# Patient Record
Sex: FEMALE | Race: WHITE | Employment: OTHER | URBAN - METROPOLITAN AREA
[De-identification: names, ages, dates, MRNs, and addresses within clinical notes are randomized per-mention and may not be internally consistent; named-entity substitution may affect disease eponyms.]

---

## 2019-11-15 ENCOUNTER — TRANSFERRED RECORDS (OUTPATIENT)
Dept: HEALTH INFORMATION MANAGEMENT | Facility: CLINIC | Age: 84
End: 2019-11-15

## 2019-11-15 ENCOUNTER — HOSPITAL ENCOUNTER (INPATIENT)
Facility: CLINIC | Age: 84
LOS: 1 days | Discharge: LEFT BEFORE DISCHARGE | DRG: 641 | End: 2019-11-18
Attending: EMERGENCY MEDICINE | Admitting: HOSPITALIST
Payer: COMMERCIAL

## 2019-11-15 DIAGNOSIS — R41.0 CONFUSION: ICD-10-CM

## 2019-11-15 DIAGNOSIS — R40.4 TRANSIENT ALTERATION OF AWARENESS: ICD-10-CM

## 2019-11-15 DIAGNOSIS — R45.1 AGITATION: ICD-10-CM

## 2019-11-15 DIAGNOSIS — G93.40 ENCEPHALOPATHY ACUTE: Primary | ICD-10-CM

## 2019-11-15 PROCEDURE — 99285 EMERGENCY DEPT VISIT HI MDM: CPT | Mod: 25

## 2019-11-15 PROCEDURE — 93005 ELECTROCARDIOGRAM TRACING: CPT

## 2019-11-16 ENCOUNTER — APPOINTMENT (OUTPATIENT)
Dept: OCCUPATIONAL THERAPY | Facility: CLINIC | Age: 84
DRG: 641 | End: 2019-11-16
Attending: HOSPITALIST
Payer: COMMERCIAL

## 2019-11-16 ENCOUNTER — APPOINTMENT (OUTPATIENT)
Dept: CT IMAGING | Facility: CLINIC | Age: 84
DRG: 641 | End: 2019-11-16
Attending: EMERGENCY MEDICINE
Payer: COMMERCIAL

## 2019-11-16 PROBLEM — G93.40 ENCEPHALOPATHY ACUTE: Status: ACTIVE | Noted: 2019-11-16

## 2019-11-16 LAB
ALBUMIN SERPL-MCNC: 3.6 G/DL (ref 3.4–5)
ALBUMIN UR-MCNC: NEGATIVE MG/DL
ALP SERPL-CCNC: 84 U/L (ref 40–150)
ALT SERPL W P-5'-P-CCNC: 17 U/L (ref 0–50)
AMMONIA PLAS-SCNC: 23 UMOL/L (ref 10–50)
ANION GAP SERPL CALCULATED.3IONS-SCNC: 5 MMOL/L (ref 3–14)
ANION GAP SERPL CALCULATED.3IONS-SCNC: 6 MMOL/L (ref 3–14)
APPEARANCE UR: CLEAR
APTT PPP: 33 SEC (ref 22–37)
AST SERPL W P-5'-P-CCNC: 18 U/L (ref 0–45)
BASOPHILS # BLD AUTO: 0 10E9/L (ref 0–0.2)
BASOPHILS # BLD AUTO: 0 10E9/L (ref 0–0.2)
BASOPHILS NFR BLD AUTO: 0.3 %
BASOPHILS NFR BLD AUTO: 0.7 %
BILIRUB DIRECT SERPL-MCNC: 0.1 MG/DL (ref 0–0.2)
BILIRUB SERPL-MCNC: 0.5 MG/DL (ref 0.2–1.3)
BILIRUB UR QL STRIP: NEGATIVE
BUN SERPL-MCNC: 10 MG/DL (ref 7–30)
BUN SERPL-MCNC: 15 MG/DL (ref 7–30)
CALCIUM SERPL-MCNC: 8.7 MG/DL (ref 8.5–10.1)
CALCIUM SERPL-MCNC: 9 MG/DL (ref 8.5–10.1)
CHLORIDE SERPL-SCNC: 101 MMOL/L (ref 94–109)
CHLORIDE SERPL-SCNC: 104 MMOL/L (ref 94–109)
CO2 SERPL-SCNC: 28 MMOL/L (ref 20–32)
CO2 SERPL-SCNC: 30 MMOL/L (ref 20–32)
COLOR UR AUTO: ABNORMAL
CREAT SERPL-MCNC: 0.44 MG/DL (ref 0.52–1.04)
CREAT SERPL-MCNC: 0.46 MG/DL (ref 0.52–1.04)
DIFFERENTIAL METHOD BLD: NORMAL
DIFFERENTIAL METHOD BLD: NORMAL
EOSINOPHIL # BLD AUTO: 0.1 10E9/L (ref 0–0.7)
EOSINOPHIL # BLD AUTO: 0.1 10E9/L (ref 0–0.7)
EOSINOPHIL NFR BLD AUTO: 1.4 %
EOSINOPHIL NFR BLD AUTO: 1.9 %
ERYTHROCYTE [DISTWIDTH] IN BLOOD BY AUTOMATED COUNT: 13.4 % (ref 10–15)
ERYTHROCYTE [DISTWIDTH] IN BLOOD BY AUTOMATED COUNT: 13.4 % (ref 10–15)
ETHANOL SERPL-MCNC: <0.01 G/DL
GFR SERPL CREATININE-BSD FRML MDRD: 88 ML/MIN/{1.73_M2}
GFR SERPL CREATININE-BSD FRML MDRD: 89 ML/MIN/{1.73_M2}
GLUCOSE SERPL-MCNC: 106 MG/DL (ref 70–99)
GLUCOSE SERPL-MCNC: 97 MG/DL (ref 70–99)
GLUCOSE UR STRIP-MCNC: NEGATIVE MG/DL
HCT VFR BLD AUTO: 39.3 % (ref 35–47)
HCT VFR BLD AUTO: 41.7 % (ref 35–47)
HGB BLD-MCNC: 12.4 G/DL (ref 11.7–15.7)
HGB BLD-MCNC: 13.6 G/DL (ref 11.7–15.7)
HGB UR QL STRIP: NEGATIVE
IMM GRANULOCYTES # BLD: 0 10E9/L (ref 0–0.4)
IMM GRANULOCYTES # BLD: 0 10E9/L (ref 0–0.4)
IMM GRANULOCYTES NFR BLD: 0.2 %
IMM GRANULOCYTES NFR BLD: 0.2 %
INR PPP: 0.96 (ref 0.86–1.14)
INTERPRETATION ECG - MUSE: NORMAL
KETONES UR STRIP-MCNC: NEGATIVE MG/DL
LEUKOCYTE ESTERASE UR QL STRIP: NEGATIVE
LYMPHOCYTES # BLD AUTO: 1.8 10E9/L (ref 0.8–5.3)
LYMPHOCYTES # BLD AUTO: 1.9 10E9/L (ref 0.8–5.3)
LYMPHOCYTES NFR BLD AUTO: 30.2 %
LYMPHOCYTES NFR BLD AUTO: 31.9 %
MCH RBC QN AUTO: 28.2 PG (ref 26.5–33)
MCH RBC QN AUTO: 29.2 PG (ref 26.5–33)
MCHC RBC AUTO-ENTMCNC: 31.6 G/DL (ref 31.5–36.5)
MCHC RBC AUTO-ENTMCNC: 32.6 G/DL (ref 31.5–36.5)
MCV RBC AUTO: 89 FL (ref 78–100)
MCV RBC AUTO: 90 FL (ref 78–100)
MONOCYTES # BLD AUTO: 0.5 10E9/L (ref 0–1.3)
MONOCYTES # BLD AUTO: 0.7 10E9/L (ref 0–1.3)
MONOCYTES NFR BLD AUTO: 11.4 %
MONOCYTES NFR BLD AUTO: 8.4 %
NEUTROPHILS # BLD AUTO: 3.3 10E9/L (ref 1.6–8.3)
NEUTROPHILS # BLD AUTO: 3.4 10E9/L (ref 1.6–8.3)
NEUTROPHILS NFR BLD AUTO: 56.5 %
NEUTROPHILS NFR BLD AUTO: 56.9 %
NITRATE UR QL: NEGATIVE
NRBC # BLD AUTO: 0 10*3/UL
NRBC # BLD AUTO: 0 10*3/UL
NRBC BLD AUTO-RTO: 0 /100
NRBC BLD AUTO-RTO: 0 /100
PH UR STRIP: 7.5 PH (ref 5–7)
PLATELET # BLD AUTO: 181 10E9/L (ref 150–450)
PLATELET # BLD AUTO: 192 10E9/L (ref 150–450)
POTASSIUM SERPL-SCNC: 3.5 MMOL/L (ref 3.4–5.3)
POTASSIUM SERPL-SCNC: 4.2 MMOL/L (ref 3.4–5.3)
PROCALCITONIN SERPL-MCNC: 0.06 NG/ML
PROT SERPL-MCNC: 7.2 G/DL (ref 6.8–8.8)
RBC # BLD AUTO: 4.4 10E12/L (ref 3.8–5.2)
RBC # BLD AUTO: 4.66 10E12/L (ref 3.8–5.2)
RBC #/AREA URNS AUTO: 4 /HPF (ref 0–2)
SODIUM SERPL-SCNC: 136 MMOL/L (ref 133–144)
SODIUM SERPL-SCNC: 138 MMOL/L (ref 133–144)
SOURCE: ABNORMAL
SP GR UR STRIP: >1.035 (ref 1–1.03)
TROPONIN I SERPL-MCNC: 0.04 UG/L (ref 0–0.04)
TROPONIN I SERPL-MCNC: 0.07 UG/L (ref 0–0.04)
UROBILINOGEN UR STRIP-MCNC: NORMAL MG/DL (ref 0–2)
VIT B12 SERPL-MCNC: 1136 PG/ML (ref 193–986)
WBC # BLD AUTO: 5.8 10E9/L (ref 4–11)
WBC # BLD AUTO: 5.9 10E9/L (ref 4–11)
WBC #/AREA URNS AUTO: <1 /HPF (ref 0–5)

## 2019-11-16 PROCEDURE — 85730 THROMBOPLASTIN TIME PARTIAL: CPT | Performed by: EMERGENCY MEDICINE

## 2019-11-16 PROCEDURE — 85025 COMPLETE CBC W/AUTO DIFF WBC: CPT | Performed by: EMERGENCY MEDICINE

## 2019-11-16 PROCEDURE — 97165 OT EVAL LOW COMPLEX 30 MIN: CPT | Mod: GO | Performed by: OCCUPATIONAL THERAPIST

## 2019-11-16 PROCEDURE — 25000132 ZZH RX MED GY IP 250 OP 250 PS 637: Performed by: HOSPITALIST

## 2019-11-16 PROCEDURE — 80320 DRUG SCREEN QUANTALCOHOLS: CPT | Performed by: EMERGENCY MEDICINE

## 2019-11-16 PROCEDURE — 85025 COMPLETE CBC W/AUTO DIFF WBC: CPT | Performed by: HOSPITALIST

## 2019-11-16 PROCEDURE — 84484 ASSAY OF TROPONIN QUANT: CPT | Performed by: HOSPITALIST

## 2019-11-16 PROCEDURE — 84484 ASSAY OF TROPONIN QUANT: CPT | Performed by: EMERGENCY MEDICINE

## 2019-11-16 PROCEDURE — 82140 ASSAY OF AMMONIA: CPT | Performed by: HOSPITALIST

## 2019-11-16 PROCEDURE — G0378 HOSPITAL OBSERVATION PER HR: HCPCS

## 2019-11-16 PROCEDURE — 82607 VITAMIN B-12: CPT | Performed by: HOSPITALIST

## 2019-11-16 PROCEDURE — 25000132 ZZH RX MED GY IP 250 OP 250 PS 637: Performed by: PSYCHIATRY & NEUROLOGY

## 2019-11-16 PROCEDURE — 85610 PROTHROMBIN TIME: CPT | Performed by: EMERGENCY MEDICINE

## 2019-11-16 PROCEDURE — 70450 CT HEAD/BRAIN W/O DYE: CPT

## 2019-11-16 PROCEDURE — 81001 URINALYSIS AUTO W/SCOPE: CPT | Performed by: EMERGENCY MEDICINE

## 2019-11-16 PROCEDURE — 99220 ZZC INITIAL OBSERVATION CARE,LEVL III: CPT | Performed by: HOSPITALIST

## 2019-11-16 PROCEDURE — 80048 BASIC METABOLIC PNL TOTAL CA: CPT | Performed by: EMERGENCY MEDICINE

## 2019-11-16 PROCEDURE — 25000132 ZZH RX MED GY IP 250 OP 250 PS 637: Performed by: EMERGENCY MEDICINE

## 2019-11-16 PROCEDURE — 0042T CT HEAD PERFUSION WITH CONTRAST: CPT

## 2019-11-16 PROCEDURE — 80076 HEPATIC FUNCTION PANEL: CPT | Performed by: EMERGENCY MEDICINE

## 2019-11-16 PROCEDURE — 84145 PROCALCITONIN (PCT): CPT | Performed by: HOSPITALIST

## 2019-11-16 PROCEDURE — 70498 CT ANGIOGRAPHY NECK: CPT

## 2019-11-16 PROCEDURE — 36415 COLL VENOUS BLD VENIPUNCTURE: CPT | Performed by: HOSPITALIST

## 2019-11-16 PROCEDURE — 25000128 H RX IP 250 OP 636: Performed by: EMERGENCY MEDICINE

## 2019-11-16 PROCEDURE — 80048 BASIC METABOLIC PNL TOTAL CA: CPT | Performed by: HOSPITALIST

## 2019-11-16 PROCEDURE — 99207 ZZC APP CREDIT; MD BILLING SHARED VISIT: CPT | Performed by: HOSPITALIST

## 2019-11-16 PROCEDURE — 25800030 ZZH RX IP 258 OP 636: Performed by: HOSPITALIST

## 2019-11-16 RX ORDER — SODIUM CHLORIDE, SODIUM LACTATE, POTASSIUM CHLORIDE, CALCIUM CHLORIDE 600; 310; 30; 20 MG/100ML; MG/100ML; MG/100ML; MG/100ML
INJECTION, SOLUTION INTRAVENOUS CONTINUOUS
Status: DISCONTINUED | OUTPATIENT
Start: 2019-11-16 | End: 2019-11-17

## 2019-11-16 RX ORDER — ONDANSETRON 4 MG/1
4 TABLET, ORALLY DISINTEGRATING ORAL EVERY 6 HOURS PRN
Status: DISCONTINUED | OUTPATIENT
Start: 2019-11-16 | End: 2019-11-18 | Stop reason: HOSPADM

## 2019-11-16 RX ORDER — NALOXONE HYDROCHLORIDE 0.4 MG/ML
.1-.4 INJECTION, SOLUTION INTRAMUSCULAR; INTRAVENOUS; SUBCUTANEOUS
Status: DISCONTINUED | OUTPATIENT
Start: 2019-11-16 | End: 2019-11-18 | Stop reason: HOSPADM

## 2019-11-16 RX ORDER — AMOXICILLIN 250 MG
1 CAPSULE ORAL 2 TIMES DAILY PRN
Status: DISCONTINUED | OUTPATIENT
Start: 2019-11-16 | End: 2019-11-18 | Stop reason: HOSPADM

## 2019-11-16 RX ORDER — LANOLIN ALCOHOL/MO/W.PET/CERES
3 CREAM (GRAM) TOPICAL
Status: DISCONTINUED | OUTPATIENT
Start: 2019-11-16 | End: 2019-11-18 | Stop reason: HOSPADM

## 2019-11-16 RX ORDER — ACETAMINOPHEN 325 MG/1
650 TABLET ORAL EVERY 4 HOURS PRN
Status: DISCONTINUED | OUTPATIENT
Start: 2019-11-16 | End: 2019-11-18 | Stop reason: HOSPADM

## 2019-11-16 RX ORDER — AMOXICILLIN 250 MG
2 CAPSULE ORAL 2 TIMES DAILY PRN
Status: DISCONTINUED | OUTPATIENT
Start: 2019-11-16 | End: 2019-11-18 | Stop reason: HOSPADM

## 2019-11-16 RX ORDER — BISACODYL 10 MG
10 SUPPOSITORY, RECTAL RECTAL DAILY PRN
Status: DISCONTINUED | OUTPATIENT
Start: 2019-11-16 | End: 2019-11-18 | Stop reason: HOSPADM

## 2019-11-16 RX ORDER — ACETAMINOPHEN 650 MG/1
650 SUPPOSITORY RECTAL EVERY 4 HOURS PRN
Status: DISCONTINUED | OUTPATIENT
Start: 2019-11-16 | End: 2019-11-18 | Stop reason: HOSPADM

## 2019-11-16 RX ORDER — MULTIPLE VITAMINS W/ MINERALS TAB 9MG-400MCG
1 TAB ORAL DAILY
COMMUNITY

## 2019-11-16 RX ORDER — LANOLIN ALCOHOL/MO/W.PET/CERES
100 CREAM (GRAM) TOPICAL DAILY
Status: DISCONTINUED | OUTPATIENT
Start: 2019-11-16 | End: 2019-11-17

## 2019-11-16 RX ORDER — OLANZAPINE 5 MG/1
5 TABLET, ORALLY DISINTEGRATING ORAL 2 TIMES DAILY PRN
Status: DISCONTINUED | OUTPATIENT
Start: 2019-11-16 | End: 2019-11-18 | Stop reason: HOSPADM

## 2019-11-16 RX ORDER — GUAIFENESIN 600 MG/1
600 TABLET, EXTENDED RELEASE ORAL 2 TIMES DAILY
Status: DISCONTINUED | OUTPATIENT
Start: 2019-11-16 | End: 2019-11-18 | Stop reason: HOSPADM

## 2019-11-16 RX ORDER — OLANZAPINE 5 MG/1
5 TABLET, ORALLY DISINTEGRATING ORAL ONCE
Status: COMPLETED | OUTPATIENT
Start: 2019-11-16 | End: 2019-11-16

## 2019-11-16 RX ORDER — POLYETHYLENE GLYCOL 3350 17 G/17G
17 POWDER, FOR SOLUTION ORAL DAILY PRN
Status: DISCONTINUED | OUTPATIENT
Start: 2019-11-16 | End: 2019-11-18 | Stop reason: HOSPADM

## 2019-11-16 RX ORDER — CHLORAL HYDRATE 500 MG
2 CAPSULE ORAL DAILY
COMMUNITY

## 2019-11-16 RX ORDER — LORAZEPAM 0.5 MG/1
0.5 TABLET ORAL EVERY 6 HOURS PRN
Status: ON HOLD | COMMUNITY
End: 2019-11-18

## 2019-11-16 RX ORDER — CHOLECALCIFEROL (VITAMIN D3) 50 MCG
1 TABLET ORAL DAILY
COMMUNITY

## 2019-11-16 RX ORDER — IOPAMIDOL 755 MG/ML
120 INJECTION, SOLUTION INTRAVASCULAR ONCE
Status: COMPLETED | OUTPATIENT
Start: 2019-11-16 | End: 2019-11-16

## 2019-11-16 RX ORDER — LIDOCAINE 40 MG/G
CREAM TOPICAL
Status: DISCONTINUED | OUTPATIENT
Start: 2019-11-16 | End: 2019-11-18 | Stop reason: HOSPADM

## 2019-11-16 RX ORDER — ONDANSETRON 2 MG/ML
4 INJECTION INTRAMUSCULAR; INTRAVENOUS EVERY 6 HOURS PRN
Status: DISCONTINUED | OUTPATIENT
Start: 2019-11-16 | End: 2019-11-18 | Stop reason: HOSPADM

## 2019-11-16 RX ADMIN — SODIUM CHLORIDE, POTASSIUM CHLORIDE, SODIUM LACTATE AND CALCIUM CHLORIDE: 600; 310; 30; 20 INJECTION, SOLUTION INTRAVENOUS at 05:43

## 2019-11-16 RX ADMIN — Medication 100 MG: at 16:39

## 2019-11-16 RX ADMIN — GUAIFENESIN 600 MG: 600 TABLET, EXTENDED RELEASE ORAL at 11:05

## 2019-11-16 RX ADMIN — GUAIFENESIN 600 MG: 600 TABLET, EXTENDED RELEASE ORAL at 20:37

## 2019-11-16 RX ADMIN — IOPAMIDOL 120 ML: 755 INJECTION, SOLUTION INTRAVENOUS at 01:09

## 2019-11-16 RX ADMIN — Medication 1 LOZENGE: at 18:14

## 2019-11-16 RX ADMIN — OLANZAPINE 5 MG: 5 TABLET, ORALLY DISINTEGRATING ORAL at 02:49

## 2019-11-16 ASSESSMENT — ACTIVITIES OF DAILY LIVING (ADL): PREVIOUS_RESPONSIBILITIES: MEAL PREP;HOUSEKEEPING;LAUNDRY;SHOPPING;MEDICATION MANAGEMENT;FINANCES;DRIVING

## 2019-11-16 NOTE — PLAN OF CARE
Admitted from ED around 0530. Here with AMS and confusion. AOx3, disoriented to situation. Neuros intact. Pt agitated and frustrated at times. Lung sounds diminished. Frequent congested cough. Regular diet. Up assist 1 GBW. Pt would like cane if available. IVF infusing. Refused tele. Plan pending progress.

## 2019-11-16 NOTE — ED NOTES
"While walking the the pt, she stated that she doesn't have her nogueira and believes that she left it in her car. Upon asking where her car was she stated \"Its at the place they brought me earlier tonight\". With additional questioning she stated \"The ambulance brought me to an [facility] before they brought me here tonight\". She did not mention being at a country club or talking with Police. She is cooperating with requests however is impatient and insistent on getting her IV removed and getting home. She also stated that she is so tired and doesn't want to lay down because she doesn't want to fall asleep.  "

## 2019-11-16 NOTE — ED NOTES
"[facility] [place]  ED Nurse Handoff Report    ED Chief complaint: Altered Mental Status      ED Diagnosis:   Final diagnoses:   Confusion   Transient alteration of awareness   Agitation       Code Status: See MD note    Allergies:   Allergies   Allergen Reactions     Penicillins Anaphylaxis       Activity level - Baseline/Home:  independent  Activity Level - Current:   Independent    Patient's Preferred language: English   Needed?: No    Isolation: No  Infection: Not Applicable  Bariatric?: No    Vital Signs:   Vitals:    11/15/19 2339   BP: 133/88   Temp: 98.3  F (36.8  C)   TempSrc: Oral   SpO2: 94%       Cardiac Rhythm: ,        Pain level:      Is this patient confused?: Yes   Does this patient have a guardian?  No         If yes, is there guardianship documents in the Epic \"Code/ACP\" activity?  N/A         Guardian Notified?  N/A  [place] - Suicide Severity Rating Scale Completed?  Yes  If yes, what color did the patient score?  White    Patient Report: Initial Complaint: EMS picked patient up after patient acting confused and could not recall all of the events today.  States she was on her way to a hair appointment at noon and somehow got lost while driving.  She states a  found her and asked her too many questions so she felt really upset and then somehow ended up at the hospital.  This story has changed each time it is asked. Later in her visit to the ER she became more confused and agitated so MD called a code stroke.  CT okay and code was deescalated.  Redirection is needed.  Able to follow direction and has been staying in bed.  Refused to change out of clothes, but did take earrings off.  72 hour hold.  Took zyprexa with encouragement.  Focused Assessment: A/O to self and place only  Tests Performed:   Results for orders placed or performed during the hospital encounter of 11/15/19   CBC with platelets differential     Status: None   Result Value Ref Range    WBC 5.9 " 4.0 - 11.0 10e9/L    RBC Count 4.66 3.8 - 5.2 10e12/L    Hemoglobin 13.6 11.7 - 15.7 g/dL    Hematocrit 41.7 35.0 - 47.0 %    MCV 90 78 - 100 fl    MCH 29.2 26.5 - 33.0 pg    MCHC 32.6 31.5 - 36.5 g/dL    RDW 13.4 10.0 - 15.0 %    Platelet Count 192 150 - 450 10e9/L    Diff Method Automated Method     % Neutrophils 56.9 %    % Lymphocytes 31.9 %    % Monocytes 8.4 %    % Eosinophils 1.9 %    % Basophils 0.7 %    % Immature Granulocytes 0.2 %    Nucleated RBCs 0 0 /100    Absolute Neutrophil 3.4 1.6 - 8.3 10e9/L    Absolute Lymphocytes 1.9 0.8 - 5.3 10e9/L    Absolute Monocytes 0.5 0.0 - 1.3 10e9/L    Absolute Eosinophils 0.1 0.0 - 0.7 10e9/L    Absolute Basophils 0.0 0.0 - 0.2 10e9/L    Abs Immature Granulocytes 0.0 0 - 0.4 10e9/L    Absolute Nucleated RBC 0.0    Basic metabolic panel     Status: Abnormal   Result Value Ref Range    Sodium 136 133 - 144 mmol/L    Potassium 4.2 3.4 - 5.3 mmol/L    Chloride 101 94 - 109 mmol/L    Carbon Dioxide 30 20 - 32 mmol/L    Anion Gap 5 3 - 14 mmol/L    Glucose 106 (H) 70 - 99 mg/dL    Urea Nitrogen 15 7 - 30 mg/dL    Creatinine 0.46 (L) 0.52 - 1.04 mg/dL    GFR Estimate 88 >60 mL/min/[1.73_m2]    GFR Estimate If Black >90 >60 mL/min/[1.73_m2]    Calcium 9.0 8.5 - 10.1 mg/dL   Troponin I     Status: Abnormal   Result Value Ref Range    Troponin I ES 0.069 (H) 0.000 - 0.045 ug/L   Alcohol ethyl     Status: None   Result Value Ref Range    Ethanol g/dL <0.01 <0.01 g/dL   Hepatic panel     Status: None   Result Value Ref Range    Bilirubin Direct 0.1 0.0 - 0.2 mg/dL    Bilirubin Total 0.5 0.2 - 1.3 mg/dL    Albumin 3.6 3.4 - 5.0 g/dL    Protein Total 7.2 6.8 - 8.8 g/dL    Alkaline Phosphatase 84 40 - 150 U/L    ALT 17 0 - 50 U/L    AST 18 0 - 45 U/L   INR     Status: None   Result Value Ref Range    INR 0.96 0.86 - 1.14   Partial thromboplastin time     Status: None   Result Value Ref Range    PTT 33 22 - 37 sec   EKG 12 lead     Status: None   Result Value Ref Range     Interpretation ECG Click View Image link to view waveform and result        Abnormal Results: see above  Treatments provided: see above    Family Comments: none    OBS brochure/video discussed/provided to patient/family: Yes              Name of person given brochure if not patient: na              Relationship to patient: na    ED Medications:   Medications   100mL Saline flush (has no administration in time range)   iopamidol (ISOVUE-370) solution 120 mL (120 mLs Intravenous Given 11/16/19 0109)       Drips infusing?:  No    For the majority of the shift this patient was Yellow  Interventions performed were encouragement and redirection    Severe Sepsis OR Septic Shock Diagnosis Present: No    To be done/followed up on inpatient unit:      ED NURSE PHONE NUMBER: [phone]

## 2019-11-16 NOTE — ED NOTES
"Pt sitting in a chair, refuses to lay down on a stretcher. States that she is not happy about being here, \"I want to go home now\" and stands up from the chair, grabs her jacket and attempts to sridhar towards the door. When I reminded her that is midnight, its cold outside and she has no car, she returns to the chair and states \"well, I can take a cab home\" pt will be moved to room 16 as she is a flight risk and is on a hold.  "

## 2019-11-16 NOTE — PLAN OF CARE
"Discharge Planner OT   Patient plan for discharge: Home  Current status: Orders received, eval completed, Attempted to engage pt in SLUMS and MoCA exam, pt would initially participate and when questions became difficult for her she would state \"I'm not even going to try, this is stupid.\" Ed and additional time spent on engaging pt, she completed SLUMS with 17/30 indicating severe cognitive impairment; but with decreased effort and attention which may have impacted score. Generally noted that pt appeared to have decreased problem solving, had difficulty and confusion with MoCA Visuospatial portion of test and therapist changed tests due to level of frustration from pt with MoCA items. Pt drives and demonstrated difficulty with the trail portion. Pt showed decreased insight and safety awareness generally. Pt completed transfer and ambulation with SBA, somewhat unsteady with use of SEC refused 2WW. Was not receptive to other neuro or visual exams by therapist.   Barriers to return to prior living situation: cognition, safety, lives alone  Recommendations for discharge: TCU  Rationale for recommendations: At this time recommendation for TCU based on reduced safety and problem solving, based on assessment pt not safe to discharge home alone, and generally pt not cooperative with OT recommendations or activities. Pt would require additional testing prior to resuming role in medication management or driving as she is demonstrating reduced cognition, processing, and problem solving. Pt reports she does not wish to participate in further OT, is on OBS status, discharge recommendations in place, will complete orders.        Entered by: Salena López 11/16/2019 4:05 PM       "

## 2019-11-16 NOTE — ED TRIAGE NOTES
Arrived via EMS on a  hold. Patient was driving to a hair salon in [place] from her apartment in [place], police found her wondering around a country club.  At this moment pt is A&Ox4, uncooperative but answers questions, refuses to change into a gown.

## 2019-11-16 NOTE — PROGRESS NOTES
RECEIVING UNIT ED HANDOFF REVIEW    ED Nurse Handoff Report was reviewed by: Atiya Jean Baptiste RN on November 16, 2019 at 4:55 AM

## 2019-11-16 NOTE — ED PROVIDER NOTES
"  History     Chief Complaint:  Altered Mental Status    The history is provided by the patient.      Adali Fatima is a 89 year old female who presents to the emergency department today via EMS on a  Hold for evaluation of an altered mental status. 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 Police state she was found wandering around at a country club tonight and appeared to be confused, prompting them to bring her here. She admits to drinking yesterday afternoon, but she is unclear as to the timeline and the amount she had, stating it was \"a long time ago, I can't give you the exact time\". The patient states she did not have much to eat yesterday, as she was not hungry but is hungry now. She lives alone and states she has a nephew who lives in [place]. She denies having any chest pain, abdominal pain, headache, vomiting, diarrhea, as well as any numbness or tingling in her extremities. The patient adds she was seen in a clinic yesterday for evaluation of a cough she has had, it was suspected to be a URI and she did not seem significantly confused per the physician's note. She denies using any daily or street drugs.    Allergies:  Tetanus toxoid, fluid  Zoledronic acid  Calcium carbonate  Penicillins    Medications:    Lorazepam    Past Medical History:    Osteoarthritis  Cancer, breast  Osteoporosis  GERD  Depression   Anxiety     Past Surgical History:    Mastectomy  Total abdominal hysterectomy and bilateral salipingo oopherectomy   Tonsillectomy and adenoidectomy  [place] teeth extraction    Family History:    Cancer- prostate  DVT- Brother & Sister  Stroke  Blood disease  Heart disease- Mother  Hypertension  Hyperlipidemia    Social History:  The patient was unaccompanied to the ED.  Smoking Status: Positive, ocasional  Alcohol " Use: Positive   Drug Use: Negative   The patient lives on her own in an apartment in [place].    Review of Systems   All other systems reviewed and are negative.      Physical Exam     Patient Vitals for the past 24 hrs:   BP Temp Temp src Heart Rate SpO2   11/15/19 2339 133/88 98.3  F (36.8  C) Oral 82 94 %      Physical Exam  General: Resting on the bed.  Head: No obvious trauma to head.  Ears, Nose, Throat:  External ears normal.  Nose normal.  No pharyngeal erythema, swelling or exudate.  Midline uvula.  clear TMs  Eyes:  Conjunctivae clear.  Pupils are equal, round, and reactive.   Neck: Normal range of motion.  Neck supple.   CV: Regular rate and rhythm.  No murmurs.      Respiratory: Effort normal and breath sounds normal.  No wheezing or crackles.   Gastrointestinal: Soft.  No distension. There is no tenderness.    Neuro: Alert. Moving all extremities appropriately.  Normal speech.  CN II-XII grossly intact, no pronator drift, normal finger-nose-finger, visual fields intact.  Gross muscle strength intact of the proximal and distal bilateral upper and lower extremities.  Sensation intact to light touch in all 4 extremities.   Normal gait.    Skin: Skin is warm and dry.  No rash noted.   Psych: agitated, labile, aggressive     Emergency Department Course     ECG:  ECG taken at 0012, ECG read at 0020  Sinus rhythm with frequent premature ventricular complexes  Possible left atrial enlargement  Septal infarct, age undetermined  Abnormal ECG   Rate 90 bpm. IA interval 180. QRS duration 88. QT/QTc 402/491. P-R-T axes 65 47 11.  No old available to compare with.    Imaging:  Radiology findings were communicated with the patient who voiced understanding of the findings.  CT, Head without Contrast  HEAD CT:  1.  No CT evidence for acute intracranial process.  2.  Brain atrophy and presumed chronic microvascular ischemic changes as above.  Reading per radiology    CTA, Head & Neck with Contrast  HEAD CTA:   1.   Normal CTA [place].  NECK CTA:  1.  No measurable stenosis or dissection.  2.  Bilateral thyroid nodules as detailed above. Consider thyroid ultrasound if not done previously.  Reading per radiology    CT, Head Perfusion with Contrast  1.  Normal cerebral perfusion.  Reading per radiology    Laboratory:  Laboratory findings were communicated with the patient who voiced understanding of the findings.  CBC: AWNL (WBC 5.9, HGB 13.6, )   BMP: Glucose 106 (H). O/w WNL (Creatinine 0.46)  Troponin (Collected 0006): 0.069 (H)  INR: 0.96   PTT: 33   Alcohol ethyl: <0.01   Hepatic Panel: Bilirubin direct 0.1, bilirubin total 0.5, albumin 3.6, protein total 7.2, alkaline phosphatase 84, ALT 17, AST 18.    Interventions:  0249 Zyprexa 5 mg PO    Emergency Department Course:  2346 Nursing notes and vitals reviewed.  2350 I performed an exam of the patient as documented above.   0011 IV was inserted and blood was drawn for laboratory testing, results above.  0012 An ECG was performed, results above.   0045 I reevaluated the patient after tech staff noted her to be more confused upon providing a urine sample. On my exam she was more confused and her story became more inconsistent. She also seems to be having difficulty recalling things just explained to her. Her speech is normal, but she is struggling to retain information.   0100 Code stroke called.  0109 I spoke with Dr. Upton of the stroke-neurology service from [facility] regarding patient's presentation, findings, and plan of care.   0149 The patient was sent for CT scans while in the emergency department, results above.    0201 Code stroke deescalated.   0222 I spoke with Dr. Wiley of the hospitalist service from [facility] regarding patient's presentation, findings, and plan of care.     Findings and plan explained to the Patient who consents to admission. Discussed the patient with Dr. Wiley, who will admit the patient to a observed bed for further  monitoring, evaluation, and treatment.     I personally reviewed the laboratory and imaging results with the Patient and answered all related questions prior to admission.     Impression & Plan      Medical Decision Makin-year-old female otherwise largely healthy presents with altered mental status.  Vital signs are reassuring.  Broad differential was pursued including not limited to acute electrolyte, metabolic, renal dysfunction, alcohol intoxication, trauma, stroke, transient global amnesia, TIA, acute coronary syndrome, arrhythmia, etc.  Patient initially was making sense.  She was speaking in full sentences.  She seemed to be alert and oriented x4.  We are able to convince her to do blood work.  CBC shows no leukocytosis or anemia.  BMP shows no acute electrolyte, metabolic or renal dysfunction.  EKG showed sinus rhythm with PVCs but no acute ischemic changes.  Troponin was elevated of unclear etiology.  Patient denies any chest pain or shortness of breath.  I was concerned that this may be secondary to an intracranial pathology.  Alcohol level is negative.  I then reassessed the patient and she seemed to be more confused, having repetitive questioning, not oriented to place.  Code stroke was called.  CT head shows no acute intracranial hemorrhage, mass, infarct.  CT angios was normal.  Perfusion scan was normal.  No large vessel occlusion to indicate emergent procedure.  Patient is not a TPA candidate.  Symptoms seem less consistent with a stroke.  Patient's presentation may be more similar to a transient global amnesia.  Discussed with stroke neurology recommended observation to see if patient clears her mental status, this would be consistent with transient global amnesia.  If patient does not improve then MRI would be indicated.  Patient was admitted to the hospitalist.  Patient was irritable and agitated.  She did agree to take 5 of oral Zyprexa.  She was also placed on a 72-hour hold given that she  was not able to understand the situation, not able to care for self, possible danger to self and others.  Patient was admitted to the floor.      Critical Care time:  none    Diagnosis:    ICD-10-CM    1. Confusion R41.0    2. Transient alteration of awareness R40.4    3. Agitation R45.1        Disposition:  The patient is admitted into the care of Dr. Wiley.     Scribe Disclosure:  I, Kiki Jones, am serving as a scribe at 11:49 PM on 11/15/2019 to document services personally performed by Astrid Durham MD based on my observations and the provider's statements to me.    11/15/2019    EMERGENCY DEPARTMENT       Astrid Durham MD  11/16/19 0622

## 2019-11-16 NOTE — ED NOTES
Bed: ED05  Expected date:   Expected time:   Means of arrival:   Comments:  534  89F AMS/HR irregular  9956

## 2019-11-16 NOTE — CONSULTS
"[facility]    Neurology Consultation     Date of Admission:  11/15/2019      Assessment & Plan   Adali Fatima is a 89 year old female who was admitted on 11/15/2019. I was asked to see the patient for confusion.  She is doing well this morning.  Able to tell me where she is, why she is in the hospital.  While there may be some subtle cognitive changes currently and a potential history of significant alcohol use, I do not see anything that suggests a significant encephalopathy or further need for work up at this time otherwise.  Suspicion high for  Wernicke's encephalopathy, consider neuropsych testing as an outpatient for further evaluation for underlying cognitive impairment.      Recommend thiamine supplementation    I discussed the above diagnosis, assessment, and further testing with the patient.  Total time: 50 Minutes, with more than 50% of the time counseling the patient or coordination of care.       Ian Deleon MD    Code Status    Full Code        Reason for Consult   Reason for consult: I was asked by Dr. Wiley to evaluate this patient for confusion.      Chief Complaint   Confusion    History is obtained from the patient and the electronic medical chart.    History of Present Illness   Adali Fatima is a 89 year old woman who brought in after police found [place]. She is not able to provide further details but notes that she woke up in her usual health this morning, and went to go to the hair dressers later in the morning, and pulled her care over to ask a  how to get there on the way.   She can not recall any further events from the day, though per the ED physician she notes drinking 3 glasses of wine around 2-3 PM today; and when asked if she drinks alcohol daily she says \"it depends on my mood.\"   She was apparently very agitated during the interview in the ED and demanding to leave and declined to answer further " "questions.    On the floor she is alert and oriented x 4.  Understands why she is in the hospital \"I think I was unable to answer some questions correctly\", blames the  for getting her admitted.  She complains about questions I am answering but is able to answer them correctly for the most part, and follows simple and complex commands.       Past Medical History   I have reviewed this patient's medical history and updated it with pertinent information if needed.   Past Medical History:   Diagnosis Date     Anxiety      Breast cancer (H)      Degenerative arthritis      Depression      GERD (gastroesophageal reflux disease)      Inflammatory arthritis     Reportedly after Zometa     Osteoporosis        Past Surgical History   I have reviewed this patient's surgical history and updated it with pertinent information if needed.  Past Surgical History:   Procedure Laterality Date     ------------OTHER-------------      Left mastectomy in 1970     ------------OTHER-------------      partial right mastectomy in 2004     ------------OTHER-------------      [facility]     ------------OTHER-------------      Tonsillectomy       Prior to Admission Medications   Prior to Admission Medications   Prescriptions Last Dose Informant Patient Reported? Taking?   LORazepam (ATIVAN) 0.5 MG tablet   Yes Yes   Sig: Take 0.5 mg by mouth every 6 hours as needed for anxiety   fish oil-omega-3 fatty acids 1000 MG capsule   Yes Yes   Sig: Take 2 g by mouth daily   multivitamin w/minerals (THERA-VIT-M) tablet   Yes Yes   Sig: Take 1 tablet by mouth daily   vitamin D3 (CHOLECALCIFEROL) 2000 units (50 mcg) tablet   Yes Yes   Sig: Take 1 tablet by mouth daily      Facility-Administered Medications: None     Allergies   Allergies   Allergen Reactions     Penicillins Anaphylaxis       Social History   I have reviewed this patient's social history and updated it with pertinent information if needed. Adali Fatima  reports current " alcohol use.    Family History   I have reviewed this patient's family history and updated it with pertinent information if needed.   No family history on file.    Review of Systems   The 10 point Review of Systems is negative other than noted in the HPI or here.     Physical Exam   Temp: 97.7  F (36.5  C) Temp src: Oral BP: 117/61 Pulse: 87 Heart Rate: 88 Resp: 18 SpO2: 95 % O2 Device: None (Room air)    Vital Signs with Ranges  Temp:  [97.4  F (36.3  C)-98.3  F (36.8  C)] 97.7  F (36.5  C)  Pulse:  [57-87] 87  Heart Rate:  [58-89] 88  Resp:  [18] 18  BP: (100-133)/(58-88) 117/61  SpO2:  [94 %-96 %] 95 %  0 lbs 0 oz    General Appearance:  No acute distress  Neuro:       Mental Status Exam:    Awake, alert, oriented       Cranial Nerves:   CN II-XII intact           Motor:   5/5 in all extremities           Reflexes:  2+, symmetric       Sensory:  Grossly intact                   Coordination:   Grossly intact       Gait:  Normal     CV: RRR      Data   Results for orders placed or performed during the hospital encounter of 11/15/19 (from the past 24 hour(s))   CBC with platelets differential   Result Value Ref Range    WBC 5.9 4.0 - 11.0 10e9/L    RBC Count 4.66 3.8 - 5.2 10e12/L    Hemoglobin 13.6 11.7 - 15.7 g/dL    Hematocrit 41.7 35.0 - 47.0 %    MCV 90 78 - 100 fl    MCH 29.2 26.5 - 33.0 pg    MCHC 32.6 31.5 - 36.5 g/dL    RDW 13.4 10.0 - 15.0 %    Platelet Count 192 150 - 450 10e9/L    Diff Method Automated Method     % Neutrophils 56.9 %    % Lymphocytes 31.9 %    % Monocytes 8.4 %    % Eosinophils 1.9 %    % Basophils 0.7 %    % Immature Granulocytes 0.2 %    Nucleated RBCs 0 0 /100    Absolute Neutrophil 3.4 1.6 - 8.3 10e9/L    Absolute Lymphocytes 1.9 0.8 - 5.3 10e9/L    Absolute Monocytes 0.5 0.0 - 1.3 10e9/L    Absolute Eosinophils 0.1 0.0 - 0.7 10e9/L    Absolute Basophils 0.0 0.0 - 0.2 10e9/L    Abs Immature Granulocytes 0.0 0 - 0.4 10e9/L    Absolute Nucleated RBC 0.0    Basic metabolic panel    Result Value Ref Range    Sodium 136 133 - 144 mmol/L    Potassium 4.2 3.4 - 5.3 mmol/L    Chloride 101 94 - 109 mmol/L    Carbon Dioxide 30 20 - 32 mmol/L    Anion Gap 5 3 - 14 mmol/L    Glucose 106 (H) 70 - 99 mg/dL    Urea Nitrogen 15 7 - 30 mg/dL    Creatinine 0.46 (L) 0.52 - 1.04 mg/dL    GFR Estimate 88 >60 mL/min/[1.73_m2]    GFR Estimate If Black >90 >60 mL/min/[1.73_m2]    Calcium 9.0 8.5 - 10.1 mg/dL   Troponin I   Result Value Ref Range    Troponin I ES 0.069 (H) 0.000 - 0.045 ug/L   Alcohol ethyl   Result Value Ref Range    Ethanol g/dL <0.01 <0.01 g/dL   Hepatic panel   Result Value Ref Range    Bilirubin Direct 0.1 0.0 - 0.2 mg/dL    Bilirubin Total 0.5 0.2 - 1.3 mg/dL    Albumin 3.6 3.4 - 5.0 g/dL    Protein Total 7.2 6.8 - 8.8 g/dL    Alkaline Phosphatase 84 40 - 150 U/L    ALT 17 0 - 50 U/L    AST 18 0 - 45 U/L   INR   Result Value Ref Range    INR 0.96 0.86 - 1.14   Partial thromboplastin time   Result Value Ref Range    PTT 33 22 - 37 sec   EKG 12 lead   Result Value Ref Range    Interpretation ECG Click View Image link to view waveform and result    CT Head w/o Contrast    Narrative    EXAM: CT HEAD W/O CONTRAST, CTA  HEAD NECK WITH CONTRAST  LOCATION: [facility]  DATE/TIME: 11/16/2019 1:49 AM    INDICATION: Altered mental status. Confusion. Stroke code.  COMPARISON: None.  CONTRAST: 70mL Isovue-370  TECHNIQUE: Head and neck CT angiogram with IV contrast. Noncontrast head CT followed by axial helical CT images of the head and neck vessels obtained during the arterial phase of intravenous contrast administration. Axial 2D reconstructed images and   multiplanar 3D MIP reconstructed images of the head and neck vessels were performed by the technologist. Dose reduction techniques were used. All stenosis measurements made according to NASCET criteria unless otherwise specified.    FINDINGS:   NONCONTRAST HEAD CT:   INTRACRANIAL CONTENTS: No intracranial hemorrhage, extraaxial  collection, or mass effect.  No CT evidence of acute infarct. Mild presumed chronic small vessel ischemic changes. Mild generalized volume loss. No hydrocephalus.     VISUALIZED ORBITS/SINUSES/MASTOIDS: No intraorbital abnormality. No paranasal sinus mucosal disease. No middle ear or mastoid effusion.    BONES/SOFT TISSUES: No acute abnormality.    HEAD CTA:  ANTERIOR CIRCULATION: No stenosis/occlusion, aneurysm, or high flow vascular malformation. Standard [place] anatomy.    POSTERIOR CIRCULATION: No stenosis/occlusion, aneurysm, or high flow vascular malformation. Balanced vertebral arteries supply a normal basilar artery.     DURAL VENOUS SINUSES: Expected enhancement of the major dural venous sinuses.    NECK CTA:  RIGHT CAROTID: No measurable stenosis or dissection.    LEFT CAROTID: No measurable stenosis or dissection.    VERTEBRAL ARTERIES: No focal stenosis or dissection. Balanced vertebral arteries.    AORTIC ARCH: Classic aortic arch anatomy with no significant stenosis at the origin of the great vessels.    NONVASCULAR STRUCTURES: 36 x 23 x 24 mm nodule right lobe of the thyroid gland. 42 x 18 x 19 mm nodule left lobe of the thyroid gland.      Impression    IMPRESSION:   HEAD CT:  1.  No CT evidence for acute intracranial process.  2.  Brain atrophy and presumed chronic microvascular ischemic changes as above.    HEAD CTA:   1.  Normal CTA [place].    NECK CTA:  1.  No measurable stenosis or dissection.  2.  Bilateral thyroid nodules as detailed above. Consider thyroid ultrasound if not done previously.   CTA Head Neck with Contrast    Narrative    EXAM: CT HEAD W/O CONTRAST, CTA  HEAD NECK WITH CONTRAST  LOCATION: [facility]  DATE/TIME: 11/16/2019 1:49 AM    INDICATION: Altered mental status. Confusion. Stroke code.  COMPARISON: None.  CONTRAST: 70mL Isovue-370  TECHNIQUE: Head and neck CT angiogram with IV contrast. Noncontrast head CT followed by axial helical CT  images of the head and neck vessels obtained during the arterial phase of intravenous contrast administration. Axial 2D reconstructed images and   multiplanar 3D MIP reconstructed images of the head and neck vessels were performed by the technologist. Dose reduction techniques were used. All stenosis measurements made according to NASCET criteria unless otherwise specified.    FINDINGS:   NONCONTRAST HEAD CT:   INTRACRANIAL CONTENTS: No intracranial hemorrhage, extraaxial collection, or mass effect.  No CT evidence of acute infarct. Mild presumed chronic small vessel ischemic changes. Mild generalized volume loss. No hydrocephalus.     VISUALIZED ORBITS/SINUSES/MASTOIDS: No intraorbital abnormality. No paranasal sinus mucosal disease. No middle ear or mastoid effusion.    BONES/SOFT TISSUES: No acute abnormality.    HEAD CTA:  ANTERIOR CIRCULATION: No stenosis/occlusion, aneurysm, or high flow vascular malformation. Standard [place] anatomy.    POSTERIOR CIRCULATION: No stenosis/occlusion, aneurysm, or high flow vascular malformation. Balanced vertebral arteries supply a normal basilar artery.     DURAL VENOUS SINUSES: Expected enhancement of the major dural venous sinuses.    NECK CTA:  RIGHT CAROTID: No measurable stenosis or dissection.    LEFT CAROTID: No measurable stenosis or dissection.    VERTEBRAL ARTERIES: No focal stenosis or dissection. Balanced vertebral arteries.    AORTIC ARCH: Classic aortic arch anatomy with no significant stenosis at the origin of the great vessels.    NONVASCULAR STRUCTURES: 36 x 23 x 24 mm nodule right lobe of the thyroid gland. 42 x 18 x 19 mm nodule left lobe of the thyroid gland.      Impression    IMPRESSION:   HEAD CT:  1.  No CT evidence for acute intracranial process.  2.  Brain atrophy and presumed chronic microvascular ischemic changes as above.    HEAD CTA:   1.  Normal CTA [place].    NECK CTA:  1.  No measurable stenosis or dissection.  2.  Bilateral  thyroid nodules as detailed above. Consider thyroid ultrasound if not done previously.   CT Head Perfusion w Contrast    Narrative    EXAM: CT HEAD PERFUSION WITH CONTRAST  LOCATION: [facility]  DATE/TIME: 11/16/2019 2:01 AM    INDICATION: Stroke code. Confusion.  COMPARISON: CTA head and neck on the same date  TECHNIQUE: Head and neck CT angiogram with IV contrast. Noncontrast head CT followed by axial helical CT images of the head and neck vessels obtained during the arterial phase of intravenous contrast administration. Axial 2D reconstructed images and   multiplanar 3D MIP reconstructed images of the head and neck vessels were performed by the technologist. Additional CT cerebral perfusion was performed utilizing a second contrast bolus. Perfusion data were post processed with generation of standard   perfusion maps and estimation of ischemic/infarcted volumes utilizing standard threshold values. Dose reduction techniques were used.  All stenosis measurements made according to NASCET criteria unless otherwise specified.  CONTRAST: 50mL Isovue-370    FINDINGS:   CT PERFUSION:  PERFUSION MAPS: Symmetrical cerebral perfusion. No focal deficits in cerebral blood flow or volume to suggest ischemia/oligemia.    RAPID ANALYSIS:  CBF<30%: 0  Tmax>6sec: 0  Mismatch volume: 0  Mismatch ratio: 0      Impression    IMPRESSION:   CT PERFUSION:  1.  Normal cerebral perfusion.   UA with Microscopic   Result Value Ref Range    Color Urine Light Yellow     Appearance Urine Clear     Glucose Urine Negative NEG^Negative mg/dL    Bilirubin Urine Negative NEG^Negative    Ketones Urine Negative NEG^Negative mg/dL    Specific Gravity Urine >1.035 (H) 1.003 - 1.035    Blood Urine Negative NEG^Negative    pH Urine 7.5 (H) 5.0 - 7.0 pH    Protein Albumin Urine Negative NEG^Negative mg/dL    Urobilinogen mg/dL Normal 0.0 - 2.0 mg/dL    Nitrite Urine Negative NEG^Negative    Leukocyte Esterase Urine Negative NEG^Negative     Source Midstream Urine     WBC Urine <1 0 - 5 /HPF    RBC Urine 4 (H) 0 - 2 /HPF   Troponin I   Result Value Ref Range    Troponin I ES 0.045 0.000 - 0.045 ug/L   Procalcitonin   Result Value Ref Range    Procalcitonin 0.06 ng/ml   Vitamin B12   Result Value Ref Range    Vitamin B12 1,136 (H) 193 - 986 pg/mL   Ammonia   Result Value Ref Range    Ammonia 23 10 - 50 umol/L   Basic metabolic panel   Result Value Ref Range    Sodium 138 133 - 144 mmol/L    Potassium 3.5 3.4 - 5.3 mmol/L    Chloride 104 94 - 109 mmol/L    Carbon Dioxide 28 20 - 32 mmol/L    Anion Gap 6 3 - 14 mmol/L    Glucose 97 70 - 99 mg/dL    Urea Nitrogen 10 7 - 30 mg/dL    Creatinine 0.44 (L) 0.52 - 1.04 mg/dL    GFR Estimate 89 >60 mL/min/[1.73_m2]    GFR Estimate If Black >90 >60 mL/min/[1.73_m2]    Calcium 8.7 8.5 - 10.1 mg/dL   CBC with platelets differential   Result Value Ref Range    WBC 5.8 4.0 - 11.0 10e9/L    RBC Count 4.40 3.8 - 5.2 10e12/L    Hemoglobin 12.4 11.7 - 15.7 g/dL    Hematocrit 39.3 35.0 - 47.0 %    MCV 89 78 - 100 fl    MCH 28.2 26.5 - 33.0 pg    MCHC 31.6 31.5 - 36.5 g/dL    RDW 13.4 10.0 - 15.0 %    Platelet Count 181 150 - 450 10e9/L    Diff Method Automated Method     % Neutrophils 56.5 %    % Lymphocytes 30.2 %    % Monocytes 11.4 %    % Eosinophils 1.4 %    % Basophils 0.3 %    % Immature Granulocytes 0.2 %    Nucleated RBCs 0 0 /100    Absolute Neutrophil 3.3 1.6 - 8.3 10e9/L    Absolute Lymphocytes 1.8 0.8 - 5.3 10e9/L    Absolute Monocytes 0.7 0.0 - 1.3 10e9/L    Absolute Eosinophils 0.1 0.0 - 0.7 10e9/L    Absolute Basophils 0.0 0.0 - 0.2 10e9/L    Abs Immature Granulocytes 0.0 0 - 0.4 10e9/L    Absolute Nucleated RBC 0.0            Imaging and procedures:  I personally reviewed these images.

## 2019-11-16 NOTE — PROGRESS NOTES
Care Coordination:    Patient admitted as observation.  Brochure given and explained. Pt voiced understanding but noted to be admitted for AMS.  A/Ox3.  Pt states she lives alone and drives.  States she has no friends/family for me to call.  Voiced wanting to be discharge today.  Awaiting neurology consult.  CC/SW to follow for discharge needs.         Ceci Bright RN BSN  Inpatient Care Coordination  [facility]  [phone]

## 2019-11-16 NOTE — PROGRESS NOTES
Adali Fatima was seen earlier this afternoon.  States she feels pretty good.  Denies any specific complaints or concerns.  Hopes to be able to go home soon.  Denies any fevers, chest pain, shortness of breath, nausea, or abdominal pain.  She does not recall events leading up to her admission.  She states that a  was asking her a lot of questions and would not really leave her alone.  She could not remember where or why she met the .  She states that this incident happened 2 days ago and did not happen yesterday.  However, she was oriented currently and was able to tell me the city, month, year.  Her nephew was present and provided some additional history.  Apparently Adali was living in an independent living facility and moved out about a year ago.  The details surrounding this are somewhat unclear.  There have been some concerns about her memory recently.  The nephew has gotten calls from the patient's beautician stating that the patient got lost on the way to see the beautician.  Neurology note reviewed, appreciate their assistance. Concern for underlying dementia. Will ask occupational therapy to see her for a cognitive evaluation. Continue other management as previously ordered by Dr. Wiley.  Dustin Wilson MD  4:19 PM  11/16/19

## 2019-11-16 NOTE — H&P
[facility]    History and Physical  Hospitalist       Date of Admission:  11/15/2019  Date of Service (when I saw the patient): 11/16/19    ASSESSMENT  Adali Fatima is an 89 year old woman with history of breast cancer as well as chronic depression and anxiety who presents with acute encephalopathy.    PLAN    1) Acute encephalopathy: The cause is unclear at present; it could be due to transient global amnesia by history, vs possible alcohol withdrawal or other as yet undetermined cause.    -- Observation. Q 4 neuro checks. Neurology consulted. Pro-calcitonin, B12, Ammonia levels ordered     -- Placed on 72 hour legal hold and will be admitted for now with a sitter     -- PRN BID Zyprexa continued for agitation     -- We might expect TGA to resolve soon; if encephalopathy persists today, would consider further evaluation with MRI, EEG, provided sufficient sedation for safety has been attained     -- There are no clear signs of alcohol withdrawal at present on exam; if this reveals itself more clearly this morning, would start CIWA    2) Myonecrosis: Minimal. Cause unclear in the absence of prior history of CAD or chest pain. Could be due to demand ischemia in the setting of recent infectious illness.     -- Telemetry; repeat Troponin to rule out ACS; consider TTE if she might agree to the test (not yet ordered)    3) Chronic depression and anxiety: Hold any outpatient medications for now    4) Breast cancer: Status post left mastectomy remotely, followed by recurrence requiring partial right mastectomy in 2004 followed by chemo-XRT in 2005. The possibility of encephalopathy being caused by recurrence, brain metastases etc must be considered if her encephalopathy does not soon resolve    Chief Complaint   Confusion    History is obtained from the patient and the ED physician whom I have spoken with    History of Present Illness   Adali Fatima is an 89 year old woman who was reportedly brought  "in after police found her wandering around [place]. She is not able to provide further details of that, saying that she woke up in her usual health this morning, and went to go to the hair dressers later in the morning, and pulled her care over to ask a  how to get there on the way, and then she can not recall any further events from the day, though at one point during my interview she affirms drinking 3 glasses of wine around 2-3 PM today; when asked if she drinks alcohol daily she says \"it depends on my mood.\" Otherwise, she affirms ongoing cough, runny nose, nasal congestion and sinus pressure that she mentioned to a PCP provider at a visit documented 1/14/2019 as having been going on for 2 weeks; no mention was made at that visit of any signs or concerns for altered mental status. She is very agitated during the interview and demands to leave and declines to answer further questions.    In the ED, Blood pressure 133/88, temperature 98.3  F (36.8  C), temperature source Oral, SpO2 94 %.    CBC and CMP were within normal reference range. INR 0.96 and Ethanol level negative. Troponin 0.069 and EKG showed NSR with PVC's and no ST segment elevations. CT and CTA of Head and Neck showed no acute pathology though bilateral pulmonary nodules were revealed. The case was discussed with Neurology.    She remained agitated in the ED and received 5 mg oral Zyprexa.    PHYSICAL EXAM  Blood pressure 133/88, temperature 98.3  F (36.8  C), temperature source Oral, SpO2 94 %.  Constitutional: Alert and oriented to person, place but not time; agitated  HEENT: normocephalic moist mucus membranes  Respiratory: lungs clear to auscultation bilaterally  Cardiovascular: regular S1 S2   GI: abdomen soft non tender non distended bowel sounds positive  Skin: no rash, good turgor  Musculoskeletal: no clubbing, cyanosis or edema  Neurologic: extra-ocular muscles intact; moves all four extremities; does not allow me to " conduct any further neurologic exam testing  Psychiatric: Expansive affect with tangential speech pattern     DVT Prophylaxis: Pneumatic Compression Devices  Code Status: Full Code presumed    Disposition: Expected discharge in 0-3 days    Sj Wiley MD    Past Medical History    I have reviewed this patient's medical history and updated it with pertinent information if needed.   Past Medical History:   Diagnosis Date     Anxiety      Breast cancer (H)      Degenerative arthritis      Depression      GERD (gastroesophageal reflux disease)      Inflammatory arthritis     Reportedly after Zometa     Osteoporosis        Past Surgical History   I have reviewed this patient's surgical history and updated it with pertinent information if needed.  Past Surgical History:   Procedure Laterality Date     ------------OTHER-------------      Left mastectomy in 1970     ------------OTHER-------------      partial right mastectomy in 2004     ------------OTHER-------------      MAJOR BSO     ------------OTHER-------------      Tonsillectomy       Prior to Admission Medications     Need to be reconciled but include:    1) PRN Ativan 0.5 mg  2) Vitamin D3    Allergies   Allergies   Allergen Reactions     Penicillins Anaphylaxis       Social History   I have reviewed this patient's social history and updated it with pertinent information if needed. Adali Fatima  reports current alcohol use.    Family History   Family history assessed and, except as above, is non-contributory.    Review of Systems   The 10 point Review of Systems is negative other than noted in the HPI or here.     Primary Care Physician   [facility]    Data   Labs Ordered and Resulted from Time of ED Arrival Up to the Time of Departure from the ED   BASIC METABOLIC PANEL - Abnormal; Notable for the following components:       Result Value    Glucose 106 (*)     Creatinine 0.46 (*)     All other components within normal limits   TROPONIN I  - Abnormal; Notable for the following components:    Troponin I ES 0.069 (*)     All other components within normal limits   CBC WITH PLATELETS DIFFERENTIAL   ALCOHOL ETHYL   HEPATIC PANEL   INR   PARTIAL THROMBOPLASTIN TIME   ROUTINE UA WITH MICROSCOPIC       Data reviewed today:  I personally reviewed the EKG tracing showing NSR with PVC's and no ST segment elevations.    Recent Results (from the past 24 hour(s))   CT Head w/o Contrast    Narrative    EXAM: CT HEAD W/O CONTRAST, CTA  HEAD NECK WITH CONTRAST  LOCATION: [facility]  DATE/TIME: 11/16/2019 1:49 AM    INDICATION: Altered mental status. Confusion. Stroke code.  COMPARISON: None.  CONTRAST: 70mL Isovue-370  TECHNIQUE: Head and neck CT angiogram with IV contrast. Noncontrast head CT followed by axial helical CT images of the head and neck vessels obtained during the arterial phase of intravenous contrast administration. Axial 2D reconstructed images and   multiplanar 3D MIP reconstructed images of the head and neck vessels were performed by the technologist. Dose reduction techniques were used. All stenosis measurements made according to NASCET criteria unless otherwise specified.    FINDINGS:   NONCONTRAST HEAD CT:   INTRACRANIAL CONTENTS: No intracranial hemorrhage, extraaxial collection, or mass effect.  No CT evidence of acute infarct. Mild presumed chronic small vessel ischemic changes. Mild generalized volume loss. No hydrocephalus.     VISUALIZED ORBITS/SINUSES/MASTOIDS: No intraorbital abnormality. No paranasal sinus mucosal disease. No middle ear or mastoid effusion.    BONES/SOFT TISSUES: No acute abnormality.    HEAD CTA:  ANTERIOR CIRCULATION: No stenosis/occlusion, aneurysm, or high flow vascular malformation. Standard [place] anatomy.    POSTERIOR CIRCULATION: No stenosis/occlusion, aneurysm, or high flow vascular malformation. Balanced vertebral arteries supply a normal basilar artery.     DURAL VENOUS SINUSES:  Expected enhancement of the major dural venous sinuses.    NECK CTA:  RIGHT CAROTID: No measurable stenosis or dissection.    LEFT CAROTID: No measurable stenosis or dissection.    VERTEBRAL ARTERIES: No focal stenosis or dissection. Balanced vertebral arteries.    AORTIC ARCH: Classic aortic arch anatomy with no significant stenosis at the origin of the great vessels.    NONVASCULAR STRUCTURES: 36 x 23 x 24 mm nodule right lobe of the thyroid gland. 42 x 18 x 19 mm nodule left lobe of the thyroid gland.      Impression    IMPRESSION:   HEAD CT:  1.  No CT evidence for acute intracranial process.  2.  Brain atrophy and presumed chronic microvascular ischemic changes as above.    HEAD CTA:   1.  Normal CTA [place].    NECK CTA:  1.  No measurable stenosis or dissection.  2.  Bilateral thyroid nodules as detailed above. Consider thyroid ultrasound if not done previously.   CTA Head Neck with Contrast    Narrative    EXAM: CT HEAD W/O CONTRAST, CTA  HEAD NECK WITH CONTRAST  LOCATION: [facility]  DATE/TIME: 11/16/2019 1:49 AM    INDICATION: Altered mental status. Confusion. Stroke code.  COMPARISON: None.  CONTRAST: 70mL Isovue-370  TECHNIQUE: Head and neck CT angiogram with IV contrast. Noncontrast head CT followed by axial helical CT images of the head and neck vessels obtained during the arterial phase of intravenous contrast administration. Axial 2D reconstructed images and   multiplanar 3D MIP reconstructed images of the head and neck vessels were performed by the technologist. Dose reduction techniques were used. All stenosis measurements made according to NASCET criteria unless otherwise specified.    FINDINGS:   NONCONTRAST HEAD CT:   INTRACRANIAL CONTENTS: No intracranial hemorrhage, extraaxial collection, or mass effect.  No CT evidence of acute infarct. Mild presumed chronic small vessel ischemic changes. Mild generalized volume loss. No hydrocephalus.     VISUALIZED  ORBITS/SINUSES/MASTOIDS: No intraorbital abnormality. No paranasal sinus mucosal disease. No middle ear or mastoid effusion.    BONES/SOFT TISSUES: No acute abnormality.    HEAD CTA:  ANTERIOR CIRCULATION: No stenosis/occlusion, aneurysm, or high flow vascular malformation. Standard [place] anatomy.    POSTERIOR CIRCULATION: No stenosis/occlusion, aneurysm, or high flow vascular malformation. Balanced vertebral arteries supply a normal basilar artery.     DURAL VENOUS SINUSES: Expected enhancement of the major dural venous sinuses.    NECK CTA:  RIGHT CAROTID: No measurable stenosis or dissection.    LEFT CAROTID: No measurable stenosis or dissection.    VERTEBRAL ARTERIES: No focal stenosis or dissection. Balanced vertebral arteries.    AORTIC ARCH: Classic aortic arch anatomy with no significant stenosis at the origin of the great vessels.    NONVASCULAR STRUCTURES: 36 x 23 x 24 mm nodule right lobe of the thyroid gland. 42 x 18 x 19 mm nodule left lobe of the thyroid gland.      Impression    IMPRESSION:   HEAD CT:  1.  No CT evidence for acute intracranial process.  2.  Brain atrophy and presumed chronic microvascular ischemic changes as above.    HEAD CTA:   1.  Normal CTA [place].    NECK CTA:  1.  No measurable stenosis or dissection.  2.  Bilateral thyroid nodules as detailed above. Consider thyroid ultrasound if not done previously.   CT Head Perfusion w Contrast    Narrative    EXAM: CT HEAD PERFUSION WITH CONTRAST  LOCATION: [facility]  DATE/TIME: 11/16/2019 2:01 AM    INDICATION: Stroke code. Confusion.  COMPARISON: CTA head and neck on the same date  TECHNIQUE: Head and neck CT angiogram with IV contrast. Noncontrast head CT followed by axial helical CT images of the head and neck vessels obtained during the arterial phase of intravenous contrast administration. Axial 2D reconstructed images and   multiplanar 3D MIP reconstructed images of the head and neck vessels were  performed by the technologist. Additional CT cerebral perfusion was performed utilizing a second contrast bolus. Perfusion data were post processed with generation of standard   perfusion maps and estimation of ischemic/infarcted volumes utilizing standard threshold values. Dose reduction techniques were used.  All stenosis measurements made according to NASCET criteria unless otherwise specified.  CONTRAST: 50mL Isovue-370    FINDINGS:   CT PERFUSION:  PERFUSION MAPS: Symmetrical cerebral perfusion. No focal deficits in cerebral blood flow or volume to suggest ischemia/oligemia.    RAPID ANALYSIS:  CBF<30%: 0  Tmax>6sec: 0  Mismatch volume: 0  Mismatch ratio: 0      Impression    IMPRESSION:   CT PERFUSION:  1.  Normal cerebral perfusion.

## 2019-11-16 NOTE — PLAN OF CARE
Here with confusion.  Pt not cooperative with a complete neuro assessment.  Disorientated to situation.  Frustrated easily by questions and commands. Was agreeable to telemetry monitoring this afternoon. CRAWFORD.  Denies pain.  Frequent lose cough and hoarse sounding voice.  Mucinex given with some improvement.  Up in room with KEY and amina.  Nephew and his wife here this afternoon, supportive.  Plan for psych consult tomorrow.  Discharge plans pending.

## 2019-11-17 PROBLEM — G93.40 ACUTE ENCEPHALOPATHY: Status: ACTIVE | Noted: 2019-11-17

## 2019-11-17 PROCEDURE — 96374 THER/PROPH/DIAG INJ IV PUSH: CPT

## 2019-11-17 PROCEDURE — G0378 HOSPITAL OBSERVATION PER HR: HCPCS

## 2019-11-17 PROCEDURE — 99232 SBSQ HOSP IP/OBS MODERATE 35: CPT | Performed by: HOSPITALIST

## 2019-11-17 PROCEDURE — 25000128 H RX IP 250 OP 636: Performed by: HOSPITALIST

## 2019-11-17 PROCEDURE — 25800030 ZZH RX IP 258 OP 636: Performed by: HOSPITALIST

## 2019-11-17 PROCEDURE — 12000000 ZZH R&B MED SURG/OB

## 2019-11-17 PROCEDURE — 25000132 ZZH RX MED GY IP 250 OP 250 PS 637: Performed by: PSYCHIATRY & NEUROLOGY

## 2019-11-17 PROCEDURE — 25000132 ZZH RX MED GY IP 250 OP 250 PS 637: Performed by: HOSPITALIST

## 2019-11-17 PROCEDURE — 99221 1ST HOSP IP/OBS SF/LOW 40: CPT | Performed by: PSYCHIATRY & NEUROLOGY

## 2019-11-17 RX ADMIN — THIAMINE HYDROCHLORIDE 500 MG: 100 INJECTION, SOLUTION INTRAMUSCULAR; INTRAVENOUS at 12:27

## 2019-11-17 RX ADMIN — Medication 100 MG: at 08:40

## 2019-11-17 RX ADMIN — GUAIFENESIN 600 MG: 600 TABLET, EXTENDED RELEASE ORAL at 08:40

## 2019-11-17 ASSESSMENT — ACTIVITIES OF DAILY LIVING (ADL): ADLS_ACUITY_SCORE: 12

## 2019-11-17 NOTE — PROGRESS NOTES
Patient seen in follow up.  Insists she is better and wants to go home.  Oriented x 3.  Very mildly confused about situation, unclear baseline, but does not recall all of the details of her admission, does remember talking with a .     Long term will need potential placement depending on OT/PT evaluation regarding ability to go home by herself and live independently.  Consider neuropsych evaluation long term, appreciate thiamine supplementation for possible Wernicke's.

## 2019-11-17 NOTE — PROGRESS NOTES
[facility]    Medicine Progress Note - Hospitalist Service       Date of Admission:  11/15/2019  Assessment & Plan   Adali Fatima is an 89 year old woman with history of breast cancer as well as chronic depression and anxiety who presents with acute encephalopathy.     Acute encephalopathy, concern for Wernicke's encephalopathy  Suspected dementia  - Neurology consulted, note reviewed.  - Will start high dose thiamine for treatment of possible Wernicke's encephalopathy.  - Patient was placed on 72 legal hold at time of admission.  - Psychiatry consulted.  - Mental status improved overall, but still has poor short term memory and poor insight.  - Ammonia within normal limits. Vitamin B12 within normal limits. UA negative.  - No obvious signs of alcohol withdrawal.  - Appreciate consultant assistance.  Addendum:  - Discussed with psychiatry. Met with patient again this afternoon. Scored 17/30 on SLUMS test with OT. She continues to have poor memory and poor insight. She is not safe to be discharged home by herself at this time. Will ask neuropsychiatry to evaluate her tomorrow. Social work consulted.     Elevated troponin  - Mild elevation, peaked at 0.069.  - Asymptomatic. Suspect demand ischemia in setting of acute illness.     Chronic depression and anxiety  - Has PRN lorazepam on her PTA med list, hold for now.  - Psychiatry consulted as noted above.     Breast cancer  Status post left mastectomy remotely, followed by recurrence requiring partial right mastectomy in 2004 followed by chemo-XRT in 2005. No evidence of brain metastases noted on CT head at the time of admission. If significant concern remains about possible brain metastases, could consider MRI.      Diet: Regular Diet Adult    DVT Prophylaxis: Low Risk/Ambulatory with no VTE prophylaxis indicated  Mojica Catheter: not present  Code Status: Full Code      Disposition Plan   Expected discharge: unclear pending further evaluation of  her mental status.  Entered: Dustin Wilson MD 11/17/2019, 10:00 AM       The patient's care was discussed with the Bedside Nurse, Care Coordinator/ and Patient.    Dustin Wilson MD  Hospitalist Service  [facility]    ______________________________________________________________________    Interval History   Adali Fatima feels OK this morning. Feels like she is better and wants to go home. Denies fevers, chest pain, shortness of breath, nausea, abdominal pain. Oriented to person, place, and time this morning, but does not recall the events leading up to her hospitalization.    Data reviewed today: I reviewed all medications, new labs and imaging results over the last 24 hours. I personally reviewed no images or EKG's today.    Physical Exam   Vital Signs: Temp: 98.1  F (36.7  C) Temp src: Oral BP: 112/64 Pulse: 87 Heart Rate: 54 Resp: 20 SpO2: 94 % O2 Device: None (Room air)    Weight: 0 lbs 0 oz  Constitutional: awake, alert, cooperative, no apparent distress  Respiratory: clear to auscultation bilaterally, no crackles or wheezing  Cardiovascular: regular rate and rhythm, normal S1 and S2, and no murmur noted  GI: normal bowel sounds, soft, non-distended, non-tender  Skin: warm, dry  Neurologic: awake, alert, oriented to name, place and time.  Does not recall the events leading up to her hospitalization.  Talks about a  that asked her a lot of questions but does not recall why or where she was contacted by the .  Cranial nerves II-XII are grossly intact.  Motor is 5 out of 5 bilaterally.  Sensory is intact.    Data   Recent Labs   Lab 11/16/19  0752 11/16/19  0006   WBC 5.8 5.9   HGB 12.4 13.6   MCV 89 90    192   INR  --  0.96    136   POTASSIUM 3.5 4.2   CHLORIDE 104 101   CO2 28 30   BUN 10 15   CR 0.44* 0.46*   ANIONGAP 6 5   AIDAN 8.7 9.0   GLC 97 106*   ALBUMIN  --  3.6   PROTTOTAL  --  7.2   BILITOTAL  --  0.5   ALKPHOS  --   84   ALT  --  17   AST  --  18   TROPI 0.045 0.069*     Medications     lactated ringers 75 mL/hr at 11/16/19 0543       sodium chloride 0.9 %  100 mL Intravenous Once     guaiFENesin  600 mg Oral BID     sodium chloride (PF)  3 mL Intracatheter Q8H     vitamin B1  100 mg Oral Daily

## 2019-11-17 NOTE — PROGRESS NOTES
CM    I: Consult for discharge planning acknowledged; awaiting Neuro Psych assessment. Nephew stated to RN that patient had lived on the [facility] (?assisted living/?ind living apt?) but was evicted recently. Pt is reportedly on a 72 hour hold as the ED physician deemed pt is unable to care for self and may be a possible danger to self and others. Hold will begin tomorrow, 11/18, due to to admission occurring over the weekend.     P: Continue to assist as needed.    GERALDO Gamino   [facility]  [phone]

## 2019-11-17 NOTE — CONSULTS
Consult Date:  11/17/2019      REASON FOR CONSULTATION:  Encephalopathy, depression, anxiety, placed on a 72-hour hold in Emergency Department.      REQUESTING PHYSICIAN:  Dustin Wilson MD.       HISTORY OF PRESENT ILLNESS:  Ms. Adali Fatima is an 89-year-old never , single, childless, retired woman living alone in an apartment in [place], [place], history of anxiety and depression, as well as medical history of breast cancer, who presented to the Emergency Department with acute encephalopathy.  It is noted that the police brought her in after they found her wandering [place].  She was not able to describe in any significant detail on admission what was going on or why she was brought in.  Notably, her closest family, a nephew who knows her well, has indicated with collateral that the patient has been having memory problems.  Psychiatry was asked to assess today to consider decision making capacity and evaluate her ongoing mental status given the patient desires to go home and does not want to follow recommendations for further stabilization of care.      She was seen by Occupational Therapy yesterday and they documented a [facility] mental status exam score of 17/30.  Recommendations at discharge were TCU, but the patient was refusing.  Upon interview this morning, the patient reports that she feels fine.  She again is not able to describe why she is here in the hospital in the first place.  She is unable to describe what her medical conditions are.  She is unable to describe what her medical team has recommended in terms of discharge planning.  She states that she does not know who is on her medical team, who her doctors are, or what at all they are recommending.  She seems surprised when told that her medical team does not feel it is safe for her to go home.  When asked for reasons why she would like to go home, even though the doctors are not recommending at this time,  she states because she wants to take a shower and be able to wear her own clothes.  She is unable to demonstrate ability to recognize the risks of going home.  She denies that she has any problems with her memory.      She reports a history of some anxiety and depression symptoms in the past and was prescribed Ativan, which she states she takes on a very infrequent occasion.  She denies any suicidal ideation.  She denies any psychosis including auditory or visual hallucinations or any history of bipolar disorder symptomatology.      PAST PSYCHIATRIC HISTORY:  No prior psychiatric admissions.  No prior suicide attempts.  She has been on Ativan in the past but takes it very seldomly.  Follows with a primary care provider, Dr. Vaughan.      PAST MEDICAL HISTORY:  Anxiety, breast cancer, degenerative arthritis, depression, GERD, inflammatory arthritis, osteoporosis.      SURGICAL HISTORY:  Left mastectomy, partial right mastectomy, MAJOR-BSO, tonsillectomy.      PRIOR TO ADMISSION MEDICATIONS:  P.r.n.  Ativan, but states she seldom takes it,  vitamin D3.      ALLERGIES:  PENICILLIN.      SOCIAL HISTORY:  Resides in an apartment in [place] by herself.  She in the past had a longterm relationship with a man for about 40 years but is no longer with him.  She does not have any children.  She is retired.  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      FAMILY HISTORY:  Denies family history of any dementia.      REVIEW OF SYSTEMS:  Ten-point review is completed and negative other than described in the HPI.      VITAL SIGNS:  Heart rate 72, temperature 98, blood pressure 105/54, respiratory rate 18, SpO2 96% on room air.      MENTAL STATUS EXAMINATION:  She is dressed in casual clothing, seated upright in her bed.  She is maintaining fair eye contact.  She is initially cooperative, although quite irritable.  She gets frustrated with the questions that are asked  and is quite defensive.  Use of language is notable for lack of any aphasia.  Speech is nonpressured, reasonable rate and tone and prosody.  Mood is described as fine.  Affect is just a bit irritable, but otherwise unremarkable, mostly stable.  Thought form linear.  Thought form is without evidence for any flight of ideas or thought disorganization.  It is a bit brief and slightly impoverished in general though.  Thought content notable for absence of subjective paranoid ideation.  She is not responding to internal stimuli.  Denies perceptual disturbances.  She is oriented to the date, as well as location, but not to circumstances.  She is able to recite the days of the week in reverse.  I reviewed her cognitive evaluation from occupational therapy completed yesterday, had a 17/30 on a SLUMS assessment.  Insight poor, as she does not recognize her medical conditions or why she is here or what medical providers are recommending.  Judgment is thus impaired based on lack of insight and awareness into her situation and medical recommendations.      IMPRESSION, REPORT AND PLAN:  Unspecified major neurocognitive disorder.      FORMULATION:  This is an 89-year-old woman living alone in [place], [place], who was brought in by police for wandering and brought in for confusion.  She has been stabilized on the neuro floor and evaluated by Occupational Therapy who recommend discharge to TCU for further stabilization.  However, the patient is quite opposed to this, and Psychiatry was asked to assess in this setting.  At this time, the patient does not demonstrate decision making capacity as she is unable to describe in any substantial detail her medical issues or what her medical team is recommending.  Her reasoning for choosing to go against medical advice is of concern (so that she can wear her own clothes and shower, which would theoretically be available at TCU).  Her SLUMS score is quite impaired and based on  collateral from nephew that her memory has been worsening, that is of significant concern, along with the fact that she is continuing to drive and live alone, and also given that she has been wandering while we are nearing winter.      PLAN:  Please defer to a surrogate decision maker to assist the patient with making an informed decision about discharge from the hospital given she is unable to demonstrate intact decision making capacity to go home against medical advice.        RESULTS REVIEW:  CMP was unremarkable.  Creatinine 0.46.  LFTs were unremarkable.  CBC was unremarkable.  Urinalysis unremarkable.  Ethanol was less than 0.01.         NAOMY GARCIA MD             D: 2019   T: 2019   MT: ELVIN      Name:     MIKE MULLER   MRN:      [id number]        Account:       [id number]   :      05/10/1930           Consult Date:  2019      Document: [id number]

## 2019-11-17 NOTE — PLAN OF CARE
Here with confusion.  Disorientated to situation.  Cooperative at times, refusing tele and some parts of neuro assessment.  Tele was SR with bigeminal PACs. MD updated and will adjust orders.  Denies pain.  New IV site.  Cough improving.  Continues to express she wants to leave here.  72 hour hold in place.  Plan for neuro psych to see and decisions on placement.  Nephew involved in care.    1441: Pt states new IV is hurting.  No redness noted.  Took out.  Will try another place.

## 2019-11-17 NOTE — PLAN OF CARE
Her with confusion. Slept between cares and participative in neuro assessments. Disoriented to situation. frustrated easily with questions and commands. neuros intact. Tele SR with PACs. Frequent loose cough. Agitated and wanting to leave at 0400- refused medication and went back to sleep. On 72hr hold. Up SBA cane. regular diet. Plan for psych consult today.

## 2019-11-17 NOTE — PROVIDER NOTIFICATION
"Text to MD:     \"  Psych was here. He didn't talk to me after.  He left his phone number Dr Castro [phone]. No note yet.  Pt insisting on going home  \"  "

## 2019-11-17 NOTE — PROGRESS NOTES
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 cognition   Prior Functional Level Comment Per pt was IND in ADLs, uses a SEC and shower chair when she feels she needs it as L knee \"gives out\"   General Information   Onset of Illness/Injury or Date of Surgery - Date 11/15/19   Referring Physician  Dustin Wilson MD   Patient/Family Goals Statement to leave hospital   Additional Occupational Profile Info/Pertinent History of Current Problem Adali Fatima is an 89 year old woman with history of breast cancer as well as chronic depression and anxiety who presents with acute encephalopathy.   Precautions/Limitations fall precautions;other (see comments)  (sitter present)   Cognitive Status Examination   Orientation person;place   Level of Consciousness alert;confused;agitated   Follows Commands (Cognition) " "follows one step commands   Memory impaired   Attention Distractible during evaluation;Sustained attention impaired   Organization/Problem Solving Problem solving impaired;Prioritizing of information/tasks impaired   Executive Function Self awareness/monitoring impaired;Planning ability impaired;Cognitive flexibility impaired   Cognitive Comment Pt completed SLUMS with encouragement, not able to fill clock section, able to place 3, 6, 9, 12 with reasurance from therapist and stating\" I don't know,\"   Visual Perception   Visual Perception Wears glasses   Visual Perception Comments Pt states she needs her glassess but was reading small print newspaper when therapist arrived, and was able to read out loud to therapist   Range of Motion (ROM)   ROM Comment B shoulder impairment   Strength   Strength Comments B UE 4/5   Hand Strength   Hand Strength Comments B UE 4/5   Coordination   Coordination Comments Pt not cooperative with coordination exam, able to write with some shakiness   Transfer Skill: Bed to Chair/Chair to Bed   Level of [place]: Bed to Chair stand-by assist   Physical Assist/Nonphysical Assist: Bed to Chair supervision   Weight-Bearing Restrictions full weight-bearing   Assistive Device - Transfer Skill Bed to Chair Chair to Bed Rehab Eval straight cane   Transfer Skill: Sit to Stand   Level of [place]: Sit/Stand stand-by assist   Physical Assist/Nonphysical Assist: Sit/Stand supervision   Transfer Skill: Sit to Stand full weight-bearing   Assistive Device for Transfer: Sit/Stand rolling walker   Balance   Balance Comments some unsteadiness noted, pt able to self correct   Instrumental Activities of Daily Living (IADL)   Previous Responsibilities meal prep;housekeeping;laundry;shopping;medication management;finances;driving   Activities of Daily Living Analysis   Impairments Contributing to Impaired Activities of Daily Living balance impaired;cognition impaired   General Therapy Interventions " "  Planned Therapy Interventions cognition   Clinical Impression   Criteria for Skilled Therapeutic Interventions Met evaluation only   OT Diagnosis reduced safety and IND in ADLs and IADLs   Influenced by the following impairments cognition   Assessment of Occupational Performance 1-3 Performance Deficits   Identified Performance Deficits IADLs, bathing   Clinical Decision Making (Complexity) Low complexity   Therapy Frequency   (eval only)   Predicted Duration of Therapy Intervention (days/wks) 1 day   Anticipated Discharge Disposition Transitional Care Facility   Risks and Benefits of Treatment have been explained. Yes   Patient, Family & other staff in agreement with plan of care Yes   [facility] TM \"6 Clicks\"   2016, Trustees of [facility], under license to [employer].  All rights reserved.   6 Clicks Short Forms Daily Activity Inpatient Short Form   [facility]  \"6 Clicks\" Daily Activity Inpatient Short Form   1. Putting on and taking off regular lower body clothing? 3 - A Little   2. Bathing (including washing, rinsing, drying)? 3 - A Little   3. Toileting, which includes using toilet, bedpan or urinal? 3 - A Little   4. Putting on and taking off regular upper body clothing? 4 - None   5. Taking care of personal grooming such as brushing teeth? 4 - None   6. Eating meals? 4 - None   Daily Activity Raw Score (Score out of 24.Lower scores equate to lower levels of function) 21   Total Evaluation Time   Total Evaluation Time (Minutes) 30     "

## 2019-11-18 VITALS
OXYGEN SATURATION: 90 % | HEART RATE: 87 BPM | DIASTOLIC BLOOD PRESSURE: 56 MMHG | RESPIRATION RATE: 16 BRPM | SYSTOLIC BLOOD PRESSURE: 118 MMHG | TEMPERATURE: 97.5 F

## 2019-11-18 PROCEDURE — 25000132 ZZH RX MED GY IP 250 OP 250 PS 637: Performed by: HOSPITALIST

## 2019-11-18 PROCEDURE — 99239 HOSP IP/OBS DSCHRG MGMT >30: CPT | Performed by: INTERNAL MEDICINE

## 2019-11-18 RX ORDER — LANOLIN ALCOHOL/MO/W.PET/CERES
100 CREAM (GRAM) TOPICAL DAILY
Qty: 30 TABLET | Refills: 0 | Status: SHIPPED | OUTPATIENT
Start: 2019-11-18

## 2019-11-18 RX ADMIN — Medication 10 MG: at 10:01

## 2019-11-18 ASSESSMENT — ACTIVITIES OF DAILY LIVING (ADL)
ADLS_ACUITY_SCORE: 13

## 2019-11-18 NOTE — DISCHARGE SUMMARY
[facility]  Discharge Summary        Adali Fatima MRN# [id number]   YOB: 1930 Age: 89 year old     Date of Admission: 11/15/2019  Date of Discharge: 11/18/2019  Admitting Physician: Dustin Wilson MD  Discharge Physician: Darrin eHrnandez MD     Primary Provider: Nelia Gregorio  Primary Care Physician Phone Number: [phone]         Discharge Diagnoses:   1. Acute encephalopathy, suspect toxic encephalopathy component with concern for Wernicke's encephalopathy and question of mild dementia.  2. Elevated troponin due to demand ischemia.        Other Chronic Medical Problems:      1. Depression and anxiety  2. Breast cancer history.       Allergies:         Allergies   Allergen Reactions     Penicillins Anaphylaxis           Discharge Medications:        Current Discharge Medication List      START taking these medications    Details   vitamin B1 (THIAMINE) 100 MG tablet Take 1 tablet (100 mg) by mouth daily  Qty: 30 tablet, Refills: 0    Associated Diagnoses: Encephalopathy acute         CONTINUE these medications which have NOT CHANGED    Details   fish oil-omega-3 fatty acids 1000 MG capsule Take 2 g by mouth daily      multivitamin w/minerals (THERA-VIT-M) tablet Take 1 tablet by mouth daily      vitamin D3 (CHOLECALCIFEROL) 2000 units (50 mcg) tablet Take 1 tablet by mouth daily         STOP taking these medications       LORazepam (ATIVAN) 0.5 MG tablet Comments:   Reason for Stopping:                   Discharge Instructions and Follow-Up:      Discharge Orders      Home care nursing referral      Reason for your hospital stay    Encephalopathy, suspect toxic component.     Follow-up and recommended labs and tests    Follow-up with primary care provider, Nelia Gregorio, within 7 days for hospital follow-up.     Activity    Your activity upon discharge: activity as tolerated     MD face to face encounter    Documentation of Face to Face  and Certification for Home Health Services    I certify that patient: Adali Fatima is under my care and that I, or a nurse practitioner or physician's assistant working with me, had a face-to-face encounter that meets the physician face-to-face encounter requirements with this patient on: 11/18/2019.    This encounter with the patient was in whole, or in part, for the following medical condition, which is the primary reason for home health care: Home safely assessment.    I certify that, based on my findings, the following services are medically necessary home health services: Nursing.    My clinical findings support the need for the above services because: Nurse is needed: assess home safety.    Further, I certify that my clinical findings support that this patient is homebound (i.e. absences from home require considerable and taxing effort and are for medical reasons or [facility] services or infrequently or of short duration when for other reasons) because: Leaving home is medically contraindicated for the following reason(s): Other physician ordered restriction: recent hospitalization.    Based on the above findings. I certify that this patient is confined to the home and needs intermittent skilled nursing care, physical therapy and/or speech therapy.  The patient is under my care, and I have initiated the establishment of the plan of care.  This patient will be followed by a physician who will periodically review the plan of care.  Physician/Provider to provide follow up care: Nelia Gregorio Thatcher    [facility] physician (the Medicare certified [facility] provider): Darrin Hernandez MD  Physician Signature: See electronic signature associated with these discharge orders.  Date: 11/18/2019     Diet    Follow this diet upon discharge: Orders Placed This Encounter      Regular Diet Adult             Consultations This Hospital Stay:      1. Psychiatry.  2. Neurology.        Admission History:       Please see the H&P by Dustin Wilson MD on 11/15/2019 for complete details. Briefly, Adali Fatima is an 89 year old woman with history including depression/anxiety and h/o breast cancer, who presented 11/15 with AMS.        Problem Oriented Hospital Course:      Acute encephalopathy, suspect toxic encephalopathy component, concern for Wernicke's encephalopathy, question of mild dementia.  Initial evaluation, ammonia within normal limits, vitamin B12 within normal limits, UA negative. Head CT, CTA and perfusion head CT 11/16 negative. Patient was placed on 72 legal hold at time of admission. Neurology and Psychiatry consulted. [place] by Psychiatry on 11/17 that, at that time, she did not demonstrate decision making capacity. Neurology had concern for Wernicke's. Pt was started on high dose IV thiamine 11/17; however, lost IV and pt refused to have it replaced and only received 1 dose. Scored 17/30 on SLUMS test with OT 11/17. Neuropsychiatry consulted but assessment deferred in part given Psychiatry note (from 11/17). Pt much improved 11/18; alert, oriented, showing better insight; admitted to drinking 4 glasses of wine on Friday; was not agreeable to care facility and desired to go back to her apartment. I discussed with her nephew, who felt she better/at baseline. Despite improvement, it was still recommended for a higher level of care to be arranged for closer supervision, such as at an assisted living facility or memory care unit; however, patient refused to stay hospitalized despite my recommendations.  - Resume thiamine  mg daily at discharge.  - I strongly recommended abstaining from alcohol use completely; pt expressed agreement.  - Home (AMA discharge) with home RN assessment for home safety.     Elevated troponin due to demand ischemia.  Trop 0.069 on 11/16. Pt w/o chest pain/dyspnea. ECG 11/16 showed SR w/o acute ischemic findings.  Recent Labs   Lab 11/16/19  0752 11/16/19  0006   TROPI  0.045 0.069*   - Monitor clinically.     Chronic depression and anxiety  Had PRN lorazepam on her PTA med list, held on admit. Seen by Psychiatry as above.  - Discontinue PRN lorazepam.    Tobacco use d/o.  - I strongly recommended ceasing smoking; she expressed agreement.     Breast cancer  Status post left mastectomy remotely, followed by recurrence requiring partial right mastectomy in 2004 followed by chemo-XRT in 2005. No evidence of brain metastases noted on CT head at the time of admission.   - Follow-up outpatient.          Pending Results:        Unresulted Labs Ordered in the Past 30 Days of this Admission     No orders found from 10/16/2019 to 11/16/2019.                Discharge Disposition:      Discharged to home against medical advice.        Discharge Time:      Greater than 30 minutes.        Key Imaging Studies, Lab Findings and Procedures/Surgeries:        Results for orders placed or performed during the hospital encounter of 11/15/19   CT Head w/o Contrast    Narrative    EXAM: CT HEAD W/O CONTRAST, CTA  HEAD NECK WITH CONTRAST  LOCATION: [facility]  DATE/TIME: 11/16/2019 1:49 AM    INDICATION: Altered mental status. Confusion. Stroke code.  COMPARISON: None.  CONTRAST: 70mL Isovue-370  TECHNIQUE: Head and neck CT angiogram with IV contrast. Noncontrast head CT followed by axial helical CT images of the head and neck vessels obtained during the arterial phase of intravenous contrast administration. Axial 2D reconstructed images and   multiplanar 3D MIP reconstructed images of the head and neck vessels were performed by the technologist. Dose reduction techniques were used. All stenosis measurements made according to NASCET criteria unless otherwise specified.    FINDINGS:   NONCONTRAST HEAD CT:   INTRACRANIAL CONTENTS: No intracranial hemorrhage, extraaxial collection, or mass effect.  No CT evidence of acute infarct. Mild presumed chronic small vessel ischemic changes. Mild  generalized volume loss. No hydrocephalus.     VISUALIZED ORBITS/SINUSES/MASTOIDS: No intraorbital abnormality. No paranasal sinus mucosal disease. No middle ear or mastoid effusion.    BONES/SOFT TISSUES: No acute abnormality.    HEAD CTA:  ANTERIOR CIRCULATION: No stenosis/occlusion, aneurysm, or high flow vascular malformation. Standard [place] anatomy.    POSTERIOR CIRCULATION: No stenosis/occlusion, aneurysm, or high flow vascular malformation. Balanced vertebral arteries supply a normal basilar artery.     DURAL VENOUS SINUSES: Expected enhancement of the major dural venous sinuses.    NECK CTA:  RIGHT CAROTID: No measurable stenosis or dissection.    LEFT CAROTID: No measurable stenosis or dissection.    VERTEBRAL ARTERIES: No focal stenosis or dissection. Balanced vertebral arteries.    AORTIC ARCH: Classic aortic arch anatomy with no significant stenosis at the origin of the great vessels.    NONVASCULAR STRUCTURES: 36 x 23 x 24 mm nodule right lobe of the thyroid gland. 42 x 18 x 19 mm nodule left lobe of the thyroid gland.      Impression    IMPRESSION:   HEAD CT:  1.  No CT evidence for acute intracranial process.  2.  Brain atrophy and presumed chronic microvascular ischemic changes as above.    HEAD CTA:   1.  Normal CTA [place].    NECK CTA:  1.  No measurable stenosis or dissection.  2.  Bilateral thyroid nodules as detailed above. Consider thyroid ultrasound if not done previously.   CTA Head Neck with Contrast    Narrative    EXAM: CT HEAD W/O CONTRAST, CTA  HEAD NECK WITH CONTRAST  LOCATION: [facility]  DATE/TIME: 11/16/2019 1:49 AM    INDICATION: Altered mental status. Confusion. Stroke code.  COMPARISON: None.  CONTRAST: 70mL Isovue-370  TECHNIQUE: Head and neck CT angiogram with IV contrast. Noncontrast head CT followed by axial helical CT images of the head and neck vessels obtained during the arterial phase of intravenous contrast administration. Axial 2D  reconstructed images and   multiplanar 3D MIP reconstructed images of the head and neck vessels were performed by the technologist. Dose reduction techniques were used. All stenosis measurements made according to NASCET criteria unless otherwise specified.    FINDINGS:   NONCONTRAST HEAD CT:   INTRACRANIAL CONTENTS: No intracranial hemorrhage, extraaxial collection, or mass effect.  No CT evidence of acute infarct. Mild presumed chronic small vessel ischemic changes. Mild generalized volume loss. No hydrocephalus.     VISUALIZED ORBITS/SINUSES/MASTOIDS: No intraorbital abnormality. No paranasal sinus mucosal disease. No middle ear or mastoid effusion.    BONES/SOFT TISSUES: No acute abnormality.    HEAD CTA:  ANTERIOR CIRCULATION: No stenosis/occlusion, aneurysm, or high flow vascular malformation. Standard [place] anatomy.    POSTERIOR CIRCULATION: No stenosis/occlusion, aneurysm, or high flow vascular malformation. Balanced vertebral arteries supply a normal basilar artery.     DURAL VENOUS SINUSES: Expected enhancement of the major dural venous sinuses.    NECK CTA:  RIGHT CAROTID: No measurable stenosis or dissection.    LEFT CAROTID: No measurable stenosis or dissection.    VERTEBRAL ARTERIES: No focal stenosis or dissection. Balanced vertebral arteries.    AORTIC ARCH: Classic aortic arch anatomy with no significant stenosis at the origin of the great vessels.    NONVASCULAR STRUCTURES: 36 x 23 x 24 mm nodule right lobe of the thyroid gland. 42 x 18 x 19 mm nodule left lobe of the thyroid gland.      Impression    IMPRESSION:   HEAD CT:  1.  No CT evidence for acute intracranial process.  2.  Brain atrophy and presumed chronic microvascular ischemic changes as above.    HEAD CTA:   1.  Normal CTA [place].    NECK CTA:  1.  No measurable stenosis or dissection.  2.  Bilateral thyroid nodules as detailed above. Consider thyroid ultrasound if not done previously.   CT Head Perfusion w Contrast     Narrative    EXAM: CT HEAD PERFUSION WITH CONTRAST  LOCATION: [facility]  DATE/TIME: 11/16/2019 2:01 AM    INDICATION: Stroke code. Confusion.  COMPARISON: CTA head and neck on the same date  TECHNIQUE: Head and neck CT angiogram with IV contrast. Noncontrast head CT followed by axial helical CT images of the head and neck vessels obtained during the arterial phase of intravenous contrast administration. Axial 2D reconstructed images and   multiplanar 3D MIP reconstructed images of the head and neck vessels were performed by the technologist. Additional CT cerebral perfusion was performed utilizing a second contrast bolus. Perfusion data were post processed with generation of standard   perfusion maps and estimation of ischemic/infarcted volumes utilizing standard threshold values. Dose reduction techniques were used.  All stenosis measurements made according to NASCET criteria unless otherwise specified.  CONTRAST: 50mL Isovue-370    FINDINGS:   CT PERFUSION:  PERFUSION MAPS: Symmetrical cerebral perfusion. No focal deficits in cerebral blood flow or volume to suggest ischemia/oligemia.    RAPID ANALYSIS:  CBF<30%: 0  Tmax>6sec: 0  Mismatch volume: 0  Mismatch ratio: 0      Impression    IMPRESSION:   CT PERFUSION:  1.  Normal cerebral perfusion.

## 2019-11-18 NOTE — PROGRESS NOTES
Pt/family was given the Medicare Compare list for Home Care, with associated star ratings to assist with choice for referrals/discharge planning Yes    Education was given to pt/family that star ratings are updated/maintained by Medicare and can be reviewed by visiting www.medicare.gov Yes

## 2019-11-18 NOTE — PLAN OF CARE
"Disoriented to situation, stating \"Its a long story, I don't want to tell you\". Pt agitated, angry, frustrated and refusing much of RN assessment because she \"already did that 20 times in my old room, stop asking me\". VSS on RA. Independent in room, LA sitter in place. Unable to do full skin assessment as pt refused to remove her personal clothing or allow RN to review her belongings. Pt sounds stuffed up, maybe has cold, but denies she is congested or having a cough or runny nose.  No IV access, refuses replacement, re-address in AM if needed. Continent. Plan for neuropsych consult today.  "

## 2019-11-18 NOTE — PLAN OF CARE
"Pt here with confusion. A&Ox2, disoriented to situation and time. Agitated with frequent questions and assessments. Neuros intact. VSS on RA. Lung sounds diminished with frequent congested cough. Refused pm meds stating \"I don't know why you are giving this to me\"- education provided.  Regular diet, thin liquids. Takes pills whole with water. Up Independently in room. Denies pain. Pt scoring yellow on the Aggression Stop Light Tool. Plan for neuro psych consult today. Therapies recommending TCU at discharge. Patient transferred to .   "

## 2019-11-18 NOTE — PLAN OF CARE
A&OX4, VSS on RA. Denies pain, n&V. Up independently in the room. Refusing to have another IV placed. Currently has no IV. Agreeable to having it placed tomorrow. No IV thiamine given today as a result. Plan for Neuropscyh eval tomorrow. On 72 hour hold. Continue to monitor.

## 2019-11-18 NOTE — PLAN OF CARE
Patient is leaving AMA at 3:44 PM to to home  pt had no IV access. Pain at time of discharge was 0/10. Belongings returned to patient.  Discharge instructions and medications reviewed with patient and nephew.  Patient verbalized understanding and all questions were answered.  Prescriptions given to patient.  At time of discharge, patient condition was stable and left the unit escorted by CNA.

## 2019-11-18 NOTE — PROGRESS NOTES
[facility]    Internal Medicine Hospitalist Progress Note  11/18/2019  I evaluated patient on the above date.    Darrin Hernandez Jr., MD  [phone] (p)  Text Page        Assessment & Plan New actions/orders today (11/18/2019) are underlined.    Adali Fatima is an 89 year old woman with history including depression/anxiety and h/o breast cancer, who presented 11/15 with AMS.     Acute encephalopathy, suspect toxic encephalopathy component, concern for Wernicke's encephalopathy, question of mild dementia.  Initial evaluation, ammonia within normal limits, vitamin B12 within normal limits, UA negative. Head CT, CTA and perfusion head CT 11/16 negative. Patient was placed on 72 legal hold at time of admission. Neurology and Psychiatry consulted. [place] by Psychiatry on 11/17 that, at that time, she did not demonstrate decision making capacity. Neurology had concern for Wernicke's. Pt was started on high dose IV thiamine 11/17; however, lost IV and pt refused to have it replaced and only received 1 dose. Scored 17/30 on SLUMS test with OT 11/17. Neuropsychiatry consulted but assessment deferred in part given Psychiatry note (from 11/17). Pt much improved 11/18; alert, oriented, showing better insight; admitted to drinking 4 glasses of wine on Friday; was not agreeable to care facility and desired to go back to her apartment. I discussed with her nephew, who felt she better/at or near baseline. Despite improvement, it was still recommended for a higher level of care to be arranged for closer supervision, such as at an assisted living facility or memory care unit; however, patient refused to stay hospitalized despite my recommendations.  - Resume thiamine  mg daily at discharge.  - I strongly recommended abstaining from alcohol use completely; pt expressed agreement.  - Home (AMA discharge) with home RN assessment for home safety.     Elevated troponin due to demand ischemia.  Trop 0.069 on 11/16.  Pt w/o chest pain/dyspnea. ECG 11/16 showed SR w/o acute ischemic findings.  Recent Labs   Lab 11/16/19  0752 11/16/19  0006   TROPI 0.045 0.069*   - Monitor clinically.     Chronic depression and anxiety  Had PRN lorazepam on her PTA med list, held on admit. Seen by Psychiatry as above.  - Psychiatry consulted as noted above.     Breast cancer  Status post left mastectomy remotely, followed by recurrence requiring partial right mastectomy in 2004 followed by chemo-XRT in 2005. No evidence of brain metastases noted on CT head at the time of admission. If significant concern remains about possible brain metastases, could consider MRI.        Diet: Regular Diet Adult  Diet    Prophylaxis: PCD's, ambulation.   Mojica Catheter: not present  Code Status: Full Code      Disposition Plan   Expected discharge: Today (AMA).  Entered: Darrin Hernandez MD 11/18/2019, 2:54 PM     Communication.  - I d/w pt's nephew 11/18.    Interval History   Pt much improved 11/18; alert, oriented, showing better insight; admitted to drinking 4 glasses of wine on Friday. Demanding to go home; upset and frustrated being in the hospital. I discussed with her nephew, who felt she better/at or near baseline. Despite improvement, it was still recommended for a higher level of care to be arranged for closer supervision, such as at an assisted living facility or memory care unit; however, patient refused to stay hospitalized despite my recommendations.    -Data reviewed today: I reviewed all new labs and imaging over the last 24 hours. I personally reviewed no images or EKG's today.    Physical Exam   Heart Rate: 88, Blood pressure 118/56, pulse 87, temperature 97.5  F (36.4  C), temperature source Oral, resp. rate 16, SpO2 90 %.  There were no vitals filed for this visit.  Vital Signs with Ranges  Temp:  [97.5  F (36.4  C)-98  F (36.7  C)] 97.5  F (36.4  C)  Heart Rate:  [54-88] 88  Resp:  [16-18] 16  BP: (105-120)/(53-77) 118/56  SpO2:  [90  %-96 %] 90 %  Patient Vitals for the past 24 hrs:   BP Temp Temp src Heart Rate Resp SpO2   11/18/19 1108 118/56 97.5  F (36.4  C) Oral 88 16 90 %   11/18/19 1000 -- -- -- -- 16 --   11/18/19 0708 117/53 97.8  F (36.6  C) Oral 66 16 93 %   11/18/19 0149 114/77 -- -- 54 16 93 %   11/17/19 2145 -- 97.8  F (36.6  C) Oral 65 18 92 %   11/17/19 2015 120/68 98  F (36.7  C) Oral 54 18 95 %   11/17/19 1519 105/54 98  F (36.7  C) Oral 72 18 96 %     I/O's Last 24 hours  I/O last 3 completed shifts:  In: 360 [P.O.:360]  Out: -     Constitutional: Awake, alert, oriented, appropriate, shows insight.  Respiratory:   Cardiovascular:   GI:   Skin/Integumen:   Other:        Data   Recent Labs   Lab 11/16/19 0752 11/16/19  0006   WBC 5.8 5.9   HGB 12.4 13.6   MCV 89 90    192   INR  --  0.96    136   POTASSIUM 3.5 4.2   CHLORIDE 104 101   CO2 28 30   BUN 10 15   CR 0.44* 0.46*   ANIONGAP 6 5   AIDAN 8.7 9.0   GLC 97 106*   ALBUMIN  --  3.6   PROTTOTAL  --  7.2   BILITOTAL  --  0.5   ALKPHOS  --  84   ALT  --  17   AST  --  18   TROPI 0.045 0.069*     Recent Labs   Lab Test 11/16/19 0752 11/16/19  0006   GLC 97 106*         No results found for this or any previous visit (from the past 24 hour(s)).    Medications   All medications were reviewed.      sodium chloride 0.9 %  100 mL Intravenous Once     guaiFENesin  600 mg Oral BID     sodium chloride (PF)  3 mL Intracatheter Q8H     thiamine  500 mg Intravenous TID

## 2019-11-18 NOTE — CONSULTS
Neuropsychology:  Consult order received and chart reviewed. Pt is an 89 year old female brought in confused. Chart documents impaired mental status and disorientation. OT completed the MOCA with a score of 17 and Psychiatry has seen her and opined she is not competent to make decisions and needs an alternative decision maker. Records reflected she has not been cooperative and lacks insight. At this juncture neuropsych testing is not indicated given severity of deficits observed clinically and noncompliance with examinations. Accordingly I am deferring this exam with recommendation that the order for neuropsychology be cancelled. If her mental status resolves and she becomes significantly more cooperative I would be happy to see her at that juncture.    Shaun Sims, PhD, LP

## 2020-09-09 NOTE — PROGRESS NOTES
Met with patient regarding home care.  She does not feel she needs it but agrees to have it. She would like to use [facility]. Patient's nephew has the keys to her car. I explained the home bound status.  Her cousin usually brings her to her md follow ups.  Her Nephew is here and will bring her home.  Dr Hernandez spoke at length with the nephew.   -S/p angio neg x2 ,PBD 20  -CT Head: perimesencephalic SAH   -CTA Head: no aneurysm noted  -Conventional angiogram negative  -MRI neuroaxis: restricted diffusion in R BG, posterior limb of R IC, and anterior right temporal lobe  -VEEG negative   - No Further neurosurgical intervention at this time

## 2023-01-01 ENCOUNTER — LAB REQUISITION (OUTPATIENT)
Dept: LAB | Facility: CLINIC | Age: 88
End: 2023-01-01
Payer: COMMERCIAL

## 2023-01-01 DIAGNOSIS — R30.0 DYSURIA: ICD-10-CM

## 2023-01-01 LAB
ALBUMIN UR-MCNC: 20 MG/DL
APPEARANCE UR: ABNORMAL
BACTERIA UR CULT: NO GROWTH
BILIRUB UR QL STRIP: NEGATIVE
CAOX CRY #/AREA URNS HPF: ABNORMAL /HPF
COLOR UR AUTO: YELLOW
GLUCOSE UR STRIP-MCNC: NEGATIVE MG/DL
HGB UR QL STRIP: NEGATIVE
KETONES UR STRIP-MCNC: NEGATIVE MG/DL
LEUKOCYTE ESTERASE UR QL STRIP: NEGATIVE
MUCOUS THREADS #/AREA URNS LPF: PRESENT /LPF
NITRATE UR QL: NEGATIVE
PH UR STRIP: 6.5 [PH] (ref 5–7)
RBC URINE: 5 /HPF
SP GR UR STRIP: 1.02 (ref 1–1.03)
UROBILINOGEN UR STRIP-MCNC: 8 MG/DL
WBC URINE: 0 /HPF

## 2023-01-01 PROCEDURE — 87086 URINE CULTURE/COLONY COUNT: CPT | Mod: ORL | Performed by: INTERNAL MEDICINE

## 2023-01-01 PROCEDURE — 81001 URINALYSIS AUTO W/SCOPE: CPT | Mod: ORL | Performed by: INTERNAL MEDICINE

## 2023-06-27 ENCOUNTER — LAB REQUISITION (OUTPATIENT)
Dept: LAB | Facility: CLINIC | Age: 88
End: 2023-06-27
Payer: COMMERCIAL

## 2023-06-27 DIAGNOSIS — F03.90 UNSPECIFIED DEMENTIA, UNSPECIFIED SEVERITY, WITHOUT BEHAVIORAL DISTURBANCE, PSYCHOTIC DISTURBANCE, MOOD DISTURBANCE, AND ANXIETY (H): ICD-10-CM

## 2023-06-28 LAB
ALBUMIN SERPL BCG-MCNC: 3.8 G/DL (ref 3.5–5.2)
ALP SERPL-CCNC: 76 U/L (ref 35–104)
ALT SERPL W P-5'-P-CCNC: 8 U/L (ref 0–50)
ANION GAP SERPL CALCULATED.3IONS-SCNC: 10 MMOL/L (ref 7–15)
AST SERPL W P-5'-P-CCNC: 17 U/L (ref 0–45)
BILIRUB SERPL-MCNC: 0.6 MG/DL
BUN SERPL-MCNC: 15.2 MG/DL (ref 8–23)
CALCIUM SERPL-MCNC: 9.2 MG/DL (ref 8.2–9.6)
CHLORIDE SERPL-SCNC: 102 MMOL/L (ref 98–107)
CREAT SERPL-MCNC: 0.44 MG/DL (ref 0.51–0.95)
DEPRECATED HCO3 PLAS-SCNC: 24 MMOL/L (ref 22–29)
ERYTHROCYTE [DISTWIDTH] IN BLOOD BY AUTOMATED COUNT: 14.7 % (ref 10–15)
GFR SERPL CREATININE-BSD FRML MDRD: 90 ML/MIN/1.73M2
GLUCOSE SERPL-MCNC: 121 MG/DL (ref 70–99)
HCT VFR BLD AUTO: 41.3 % (ref 35–47)
HGB BLD-MCNC: 12.7 G/DL (ref 11.7–15.7)
MCH RBC QN AUTO: 27.8 PG (ref 26.5–33)
MCHC RBC AUTO-ENTMCNC: 30.8 G/DL (ref 31.5–36.5)
MCV RBC AUTO: 90 FL (ref 78–100)
PLATELET # BLD AUTO: 247 10E3/UL (ref 150–450)
POTASSIUM SERPL-SCNC: 4.1 MMOL/L (ref 3.4–5.3)
PROT SERPL-MCNC: 6.8 G/DL (ref 6.4–8.3)
RBC # BLD AUTO: 4.57 10E6/UL (ref 3.8–5.2)
SODIUM SERPL-SCNC: 136 MMOL/L (ref 136–145)
TSH SERPL DL<=0.005 MIU/L-ACNC: 0.77 UIU/ML (ref 0.3–4.2)
VIT B12 SERPL-MCNC: 271 PG/ML (ref 232–1245)
WBC # BLD AUTO: 5.6 10E3/UL (ref 4–11)

## 2023-06-28 PROCEDURE — P9603 ONE-WAY ALLOW PRORATED MILES: HCPCS | Mod: ORL | Performed by: INTERNAL MEDICINE

## 2023-06-28 PROCEDURE — 36415 COLL VENOUS BLD VENIPUNCTURE: CPT | Mod: ORL | Performed by: INTERNAL MEDICINE

## 2023-06-28 PROCEDURE — 82607 VITAMIN B-12: CPT | Mod: ORL | Performed by: INTERNAL MEDICINE

## 2023-06-28 PROCEDURE — 80053 COMPREHEN METABOLIC PANEL: CPT | Mod: ORL | Performed by: INTERNAL MEDICINE

## 2023-06-28 PROCEDURE — 85027 COMPLETE CBC AUTOMATED: CPT | Mod: ORL | Performed by: INTERNAL MEDICINE

## 2023-06-28 PROCEDURE — 84443 ASSAY THYROID STIM HORMONE: CPT | Mod: ORL | Performed by: INTERNAL MEDICINE

## 2024-01-01 ENCOUNTER — LAB REQUISITION (OUTPATIENT)
Dept: LAB | Facility: CLINIC | Age: 89
End: 2024-01-01
Payer: COMMERCIAL

## 2024-01-01 DIAGNOSIS — I50.9 HEART FAILURE, UNSPECIFIED (H): ICD-10-CM

## 2024-01-01 DIAGNOSIS — I10 ESSENTIAL (PRIMARY) HYPERTENSION: ICD-10-CM

## 2024-01-01 LAB
ANION GAP SERPL CALCULATED.3IONS-SCNC: 11 MMOL/L (ref 7–15)
ANION GAP SERPL CALCULATED.3IONS-SCNC: 13 MMOL/L (ref 7–15)
BUN SERPL-MCNC: 13.7 MG/DL (ref 8–23)
BUN SERPL-MCNC: 27.1 MG/DL (ref 8–23)
CALCIUM SERPL-MCNC: 8.9 MG/DL (ref 8.2–9.6)
CALCIUM SERPL-MCNC: 9.8 MG/DL (ref 8.2–9.6)
CHLORIDE SERPL-SCNC: 101 MMOL/L (ref 98–107)
CHLORIDE SERPL-SCNC: 98 MMOL/L (ref 98–107)
CREAT SERPL-MCNC: 0.61 MG/DL (ref 0.51–0.95)
CREAT SERPL-MCNC: 0.72 MG/DL (ref 0.51–0.95)
DEPRECATED HCO3 PLAS-SCNC: 29 MMOL/L (ref 22–29)
DEPRECATED HCO3 PLAS-SCNC: 31 MMOL/L (ref 22–29)
EGFRCR SERPLBLD CKD-EPI 2021: 78 ML/MIN/1.73M2
EGFRCR SERPLBLD CKD-EPI 2021: 82 ML/MIN/1.73M2
ERYTHROCYTE [DISTWIDTH] IN BLOOD BY AUTOMATED COUNT: 14.3 % (ref 10–15)
GLUCOSE SERPL-MCNC: 105 MG/DL (ref 70–99)
GLUCOSE SERPL-MCNC: 114 MG/DL (ref 70–99)
HCT VFR BLD AUTO: 34.4 % (ref 35–47)
HGB BLD-MCNC: 10.4 G/DL (ref 11.7–15.7)
MCH RBC QN AUTO: 28.1 PG (ref 26.5–33)
MCHC RBC AUTO-ENTMCNC: 30.2 G/DL (ref 31.5–36.5)
MCV RBC AUTO: 93 FL (ref 78–100)
PLATELET # BLD AUTO: 228 10E3/UL (ref 150–450)
POTASSIUM SERPL-SCNC: 3.1 MMOL/L (ref 3.4–5.3)
POTASSIUM SERPL-SCNC: 4.2 MMOL/L (ref 3.4–5.3)
RBC # BLD AUTO: 3.7 10E6/UL (ref 3.8–5.2)
SODIUM SERPL-SCNC: 140 MMOL/L (ref 135–145)
SODIUM SERPL-SCNC: 143 MMOL/L (ref 135–145)
WBC # BLD AUTO: 6.3 10E3/UL (ref 4–11)

## 2024-01-01 PROCEDURE — P9604 ONE-WAY ALLOW PRORATED TRIP: HCPCS | Mod: ORL | Performed by: INTERNAL MEDICINE

## 2024-01-01 PROCEDURE — 85027 COMPLETE CBC AUTOMATED: CPT | Mod: ORL | Performed by: INTERNAL MEDICINE

## 2024-01-01 PROCEDURE — 80048 BASIC METABOLIC PNL TOTAL CA: CPT | Mod: ORL | Performed by: INTERNAL MEDICINE

## 2024-01-01 PROCEDURE — 36415 COLL VENOUS BLD VENIPUNCTURE: CPT | Mod: ORL | Performed by: INTERNAL MEDICINE

## 2024-01-01 PROCEDURE — P9603 ONE-WAY ALLOW PRORATED MILES: HCPCS | Mod: ORL | Performed by: INTERNAL MEDICINE
